# Patient Record
Sex: MALE | Race: WHITE | NOT HISPANIC OR LATINO | Employment: OTHER | URBAN - METROPOLITAN AREA
[De-identification: names, ages, dates, MRNs, and addresses within clinical notes are randomized per-mention and may not be internally consistent; named-entity substitution may affect disease eponyms.]

---

## 2017-10-11 ENCOUNTER — RECORDS - HEALTHEAST (OUTPATIENT)
Dept: LAB | Facility: CLINIC | Age: 62
End: 2017-10-11

## 2017-10-11 LAB
CHOLEST SERPL-MCNC: 238 MG/DL
FASTING STATUS PATIENT QL REPORTED: ABNORMAL
HDLC SERPL-MCNC: 47 MG/DL
LDLC SERPL CALC-MCNC: 151 MG/DL
TRIGL SERPL-MCNC: 201 MG/DL

## 2018-10-18 ENCOUNTER — RECORDS - HEALTHEAST (OUTPATIENT)
Dept: LAB | Facility: CLINIC | Age: 63
End: 2018-10-18

## 2018-10-18 LAB
ANION GAP SERPL CALCULATED.3IONS-SCNC: 11 MMOL/L (ref 5–18)
BUN SERPL-MCNC: 19 MG/DL (ref 8–22)
CALCIUM SERPL-MCNC: 9.5 MG/DL (ref 8.5–10.5)
CHLORIDE BLD-SCNC: 103 MMOL/L (ref 98–107)
CO2 SERPL-SCNC: 26 MMOL/L (ref 22–31)
CREAT SERPL-MCNC: 1.02 MG/DL (ref 0.7–1.3)
GFR SERPL CREATININE-BSD FRML MDRD: >60 ML/MIN/1.73M2
GLUCOSE BLD-MCNC: 118 MG/DL (ref 70–125)
POTASSIUM BLD-SCNC: 4.7 MMOL/L (ref 3.5–5)
SODIUM SERPL-SCNC: 140 MMOL/L (ref 136–145)

## 2019-12-06 ENCOUNTER — RECORDS - HEALTHEAST (OUTPATIENT)
Dept: LAB | Facility: CLINIC | Age: 64
End: 2019-12-06

## 2019-12-06 LAB
ANION GAP SERPL CALCULATED.3IONS-SCNC: 9 MMOL/L (ref 5–18)
BUN SERPL-MCNC: 16 MG/DL (ref 8–22)
CALCIUM SERPL-MCNC: 9 MG/DL (ref 8.5–10.5)
CHLORIDE BLD-SCNC: 104 MMOL/L (ref 98–107)
CHOLEST SERPL-MCNC: 247 MG/DL
CO2 SERPL-SCNC: 25 MMOL/L (ref 22–31)
CREAT SERPL-MCNC: 1.13 MG/DL (ref 0.7–1.3)
FASTING STATUS PATIENT QL REPORTED: ABNORMAL
GFR SERPL CREATININE-BSD FRML MDRD: >60 ML/MIN/1.73M2
GLUCOSE BLD-MCNC: 107 MG/DL (ref 70–125)
HDLC SERPL-MCNC: 47 MG/DL
LDLC SERPL CALC-MCNC: 161 MG/DL
POTASSIUM BLD-SCNC: 4.5 MMOL/L (ref 3.5–5)
PSA SERPL-MCNC: 2.4 NG/ML (ref 0–4.5)
SODIUM SERPL-SCNC: 138 MMOL/L (ref 136–145)
TRIGL SERPL-MCNC: 195 MG/DL

## 2019-12-08 LAB — HBA1C MFR BLD: 6.4 % (ref 4.2–6.1)

## 2020-05-21 ENCOUNTER — RECORDS - HEALTHEAST (OUTPATIENT)
Dept: LAB | Facility: CLINIC | Age: 65
End: 2020-05-21

## 2020-05-21 LAB
ANION GAP SERPL CALCULATED.3IONS-SCNC: 10 MMOL/L (ref 5–18)
BUN SERPL-MCNC: 21 MG/DL (ref 8–22)
CALCIUM SERPL-MCNC: 9.4 MG/DL (ref 8.5–10.5)
CHLORIDE BLD-SCNC: 101 MMOL/L (ref 98–107)
CHOLEST SERPL-MCNC: 222 MG/DL
CO2 SERPL-SCNC: 27 MMOL/L (ref 22–31)
CREAT SERPL-MCNC: 1.11 MG/DL (ref 0.7–1.3)
FASTING STATUS PATIENT QL REPORTED: ABNORMAL
GFR SERPL CREATININE-BSD FRML MDRD: >60 ML/MIN/1.73M2
GLUCOSE BLD-MCNC: 121 MG/DL (ref 70–125)
HDLC SERPL-MCNC: 61 MG/DL
LDLC SERPL CALC-MCNC: 141 MG/DL
POTASSIUM BLD-SCNC: 4.4 MMOL/L (ref 3.5–5)
SODIUM SERPL-SCNC: 138 MMOL/L (ref 136–145)
TRIGL SERPL-MCNC: 99 MG/DL

## 2020-05-26 ENCOUNTER — RECORDS - HEALTHEAST (OUTPATIENT)
Dept: LAB | Facility: CLINIC | Age: 65
End: 2020-05-26

## 2020-05-26 LAB — AST SERPL W P-5'-P-CCNC: 25 U/L (ref 0–40)

## 2020-11-04 ENCOUNTER — RECORDS - HEALTHEAST (OUTPATIENT)
Dept: LAB | Facility: CLINIC | Age: 65
End: 2020-11-04

## 2020-11-04 LAB
ALBUMIN SERPL-MCNC: 3.8 G/DL (ref 3.5–5)
ALP SERPL-CCNC: 64 U/L (ref 45–120)
ALT SERPL W P-5'-P-CCNC: 27 U/L (ref 0–45)
ANION GAP SERPL CALCULATED.3IONS-SCNC: 8 MMOL/L (ref 5–18)
AST SERPL W P-5'-P-CCNC: 20 U/L (ref 0–40)
BILIRUB SERPL-MCNC: 1.3 MG/DL (ref 0–1)
BUN SERPL-MCNC: 17 MG/DL (ref 8–22)
CALCIUM SERPL-MCNC: 8.8 MG/DL (ref 8.5–10.5)
CHLORIDE BLD-SCNC: 104 MMOL/L (ref 98–107)
CHOLEST SERPL-MCNC: 186 MG/DL
CO2 SERPL-SCNC: 26 MMOL/L (ref 22–31)
CREAT SERPL-MCNC: 0.99 MG/DL (ref 0.7–1.3)
FASTING STATUS PATIENT QL REPORTED: NORMAL
GFR SERPL CREATININE-BSD FRML MDRD: >60 ML/MIN/1.73M2
GLUCOSE BLD-MCNC: 117 MG/DL (ref 70–125)
HDLC SERPL-MCNC: 55 MG/DL
LDLC SERPL CALC-MCNC: 109 MG/DL
POTASSIUM BLD-SCNC: 4.6 MMOL/L (ref 3.5–5)
PROT SERPL-MCNC: 6.7 G/DL (ref 6–8)
SODIUM SERPL-SCNC: 138 MMOL/L (ref 136–145)
TRIGL SERPL-MCNC: 109 MG/DL

## 2020-11-05 LAB — B BURGDOR IGG+IGM SER QL: 0.02 INDEX VALUE

## 2021-04-13 ENCOUNTER — RECORDS - HEALTHEAST (OUTPATIENT)
Dept: LAB | Facility: CLINIC | Age: 66
End: 2021-04-13

## 2021-04-13 LAB
ANION GAP SERPL CALCULATED.3IONS-SCNC: 11 MMOL/L (ref 5–18)
BUN SERPL-MCNC: 16 MG/DL (ref 8–22)
CALCIUM SERPL-MCNC: 9 MG/DL (ref 8.5–10.5)
CHLORIDE BLD-SCNC: 105 MMOL/L (ref 98–107)
CHOLEST SERPL-MCNC: 170 MG/DL
CO2 SERPL-SCNC: 24 MMOL/L (ref 22–31)
CREAT SERPL-MCNC: 0.98 MG/DL (ref 0.7–1.3)
FASTING STATUS PATIENT QL REPORTED: NORMAL
GFR SERPL CREATININE-BSD FRML MDRD: >60 ML/MIN/1.73M2
GLUCOSE BLD-MCNC: 103 MG/DL (ref 70–125)
HDLC SERPL-MCNC: 51 MG/DL
LDLC SERPL CALC-MCNC: 103 MG/DL
POTASSIUM BLD-SCNC: 5.1 MMOL/L (ref 3.5–5)
SODIUM SERPL-SCNC: 140 MMOL/L (ref 136–145)
TRIGL SERPL-MCNC: 79 MG/DL

## 2021-11-02 ENCOUNTER — LAB REQUISITION (OUTPATIENT)
Dept: LAB | Facility: CLINIC | Age: 66
End: 2021-11-02

## 2021-11-02 DIAGNOSIS — I10 ESSENTIAL (PRIMARY) HYPERTENSION: ICD-10-CM

## 2021-11-02 LAB
ANION GAP SERPL CALCULATED.3IONS-SCNC: 11 MMOL/L (ref 5–18)
BUN SERPL-MCNC: 16 MG/DL (ref 8–22)
CALCIUM SERPL-MCNC: 9.2 MG/DL (ref 8.5–10.5)
CHLORIDE BLD-SCNC: 102 MMOL/L (ref 98–107)
CHOLEST SERPL-MCNC: 166 MG/DL
CO2 SERPL-SCNC: 26 MMOL/L (ref 22–31)
CREAT SERPL-MCNC: 1.03 MG/DL (ref 0.7–1.3)
GFR SERPL CREATININE-BSD FRML MDRD: 74 ML/MIN/1.73M2
GLUCOSE BLD-MCNC: 127 MG/DL (ref 70–125)
HDLC SERPL-MCNC: 53 MG/DL
LDLC SERPL CALC-MCNC: 89 MG/DL
POTASSIUM BLD-SCNC: 4.6 MMOL/L (ref 3.5–5)
SODIUM SERPL-SCNC: 139 MMOL/L (ref 136–145)
TRIGL SERPL-MCNC: 119 MG/DL

## 2021-11-02 PROCEDURE — 80061 LIPID PANEL: CPT | Performed by: FAMILY MEDICINE

## 2021-11-02 PROCEDURE — 80048 BASIC METABOLIC PNL TOTAL CA: CPT | Performed by: FAMILY MEDICINE

## 2022-06-08 ENCOUNTER — LAB REQUISITION (OUTPATIENT)
Dept: LAB | Facility: CLINIC | Age: 67
End: 2022-06-08

## 2022-06-08 DIAGNOSIS — E78.5 HYPERLIPIDEMIA, UNSPECIFIED: ICD-10-CM

## 2022-06-08 DIAGNOSIS — Z12.5 ENCOUNTER FOR SCREENING FOR MALIGNANT NEOPLASM OF PROSTATE: ICD-10-CM

## 2022-06-08 DIAGNOSIS — I10 ESSENTIAL (PRIMARY) HYPERTENSION: ICD-10-CM

## 2022-06-08 LAB
ANION GAP SERPL CALCULATED.3IONS-SCNC: 13 MMOL/L (ref 5–18)
BUN SERPL-MCNC: 17 MG/DL (ref 8–22)
CALCIUM SERPL-MCNC: 9.2 MG/DL (ref 8.5–10.5)
CHLORIDE BLD-SCNC: 104 MMOL/L (ref 98–107)
CHOLEST SERPL-MCNC: 179 MG/DL
CO2 SERPL-SCNC: 24 MMOL/L (ref 22–31)
CREAT SERPL-MCNC: 1 MG/DL (ref 0.7–1.3)
FASTING STATUS PATIENT QL REPORTED: NORMAL
GFR SERPL CREATININE-BSD FRML MDRD: 82 ML/MIN/1.73M2
GLUCOSE BLD-MCNC: 122 MG/DL (ref 70–125)
HDLC SERPL-MCNC: 65 MG/DL
LDLC SERPL CALC-MCNC: 96 MG/DL
POTASSIUM BLD-SCNC: 4.7 MMOL/L (ref 3.5–5)
PSA SERPL-MCNC: 4.42 UG/L (ref 0–4.5)
SODIUM SERPL-SCNC: 141 MMOL/L (ref 136–145)
TRIGL SERPL-MCNC: 91 MG/DL

## 2022-06-08 PROCEDURE — 83718 ASSAY OF LIPOPROTEIN: CPT | Performed by: FAMILY MEDICINE

## 2022-06-08 PROCEDURE — 80048 BASIC METABOLIC PNL TOTAL CA: CPT | Performed by: FAMILY MEDICINE

## 2022-06-08 PROCEDURE — G0103 PSA SCREENING: HCPCS | Performed by: FAMILY MEDICINE

## 2022-08-24 ENCOUNTER — LAB REQUISITION (OUTPATIENT)
Dept: LAB | Facility: CLINIC | Age: 67
End: 2022-08-24

## 2022-08-24 DIAGNOSIS — Z01.818 ENCOUNTER FOR OTHER PREPROCEDURAL EXAMINATION: ICD-10-CM

## 2022-08-24 LAB
ANION GAP SERPL CALCULATED.3IONS-SCNC: 10 MMOL/L (ref 7–15)
BUN SERPL-MCNC: 19.5 MG/DL (ref 8–23)
CALCIUM SERPL-MCNC: 9.3 MG/DL (ref 8.8–10.2)
CHLORIDE SERPL-SCNC: 103 MMOL/L (ref 98–107)
CREAT SERPL-MCNC: 1.15 MG/DL (ref 0.67–1.17)
DEPRECATED HCO3 PLAS-SCNC: 26 MMOL/L (ref 22–29)
GFR SERPL CREATININE-BSD FRML MDRD: 69 ML/MIN/1.73M2
GLUCOSE SERPL-MCNC: 125 MG/DL (ref 70–99)
POTASSIUM SERPL-SCNC: 5.2 MMOL/L (ref 3.4–5.3)
SODIUM SERPL-SCNC: 139 MMOL/L (ref 136–145)

## 2022-08-24 PROCEDURE — 80048 BASIC METABOLIC PNL TOTAL CA: CPT | Performed by: NURSE PRACTITIONER

## 2022-08-30 ENCOUNTER — LAB REQUISITION (OUTPATIENT)
Dept: LAB | Facility: CLINIC | Age: 67
End: 2022-08-30
Payer: COMMERCIAL

## 2022-08-30 DIAGNOSIS — Z03.818 ENCOUNTER FOR OBSERVATION FOR SUSPECTED EXPOSURE TO OTHER BIOLOGICAL AGENTS RULED OUT: ICD-10-CM

## 2022-08-30 LAB — SARS-COV-2 RNA RESP QL NAA+PROBE: NEGATIVE

## 2022-08-30 PROCEDURE — U0003 INFECTIOUS AGENT DETECTION BY NUCLEIC ACID (DNA OR RNA); SEVERE ACUTE RESPIRATORY SYNDROME CORONAVIRUS 2 (SARS-COV-2) (CORONAVIRUS DISEASE [COVID-19]), AMPLIFIED PROBE TECHNIQUE, MAKING USE OF HIGH THROUGHPUT TECHNOLOGIES AS DESCRIBED BY CMS-2020-01-R: HCPCS | Mod: ORL | Performed by: NURSE PRACTITIONER

## 2022-09-28 ENCOUNTER — LAB REQUISITION (OUTPATIENT)
Dept: LAB | Facility: CLINIC | Age: 67
End: 2022-09-28

## 2022-09-28 DIAGNOSIS — E78.5 HYPERLIPIDEMIA, UNSPECIFIED: ICD-10-CM

## 2022-09-28 LAB
AST SERPL W P-5'-P-CCNC: 27 U/L (ref 10–50)
CHOLEST SERPL-MCNC: 180 MG/DL
HDLC SERPL-MCNC: 66 MG/DL
LDLC SERPL CALC-MCNC: 100 MG/DL
NONHDLC SERPL-MCNC: 114 MG/DL
TRIGL SERPL-MCNC: 68 MG/DL

## 2022-09-28 PROCEDURE — 83718 ASSAY OF LIPOPROTEIN: CPT | Performed by: FAMILY MEDICINE

## 2022-09-28 PROCEDURE — 84450 TRANSFERASE (AST) (SGOT): CPT | Performed by: FAMILY MEDICINE

## 2022-12-05 ENCOUNTER — LAB REQUISITION (OUTPATIENT)
Dept: LAB | Facility: CLINIC | Age: 67
End: 2022-12-05

## 2022-12-05 DIAGNOSIS — E78.5 HYPERLIPIDEMIA, UNSPECIFIED: ICD-10-CM

## 2022-12-05 LAB
CHOLEST SERPL-MCNC: 118 MG/DL
HDLC SERPL-MCNC: 57 MG/DL
LDLC SERPL CALC-MCNC: 44 MG/DL
NONHDLC SERPL-MCNC: 61 MG/DL
TRIGL SERPL-MCNC: 87 MG/DL

## 2022-12-05 PROCEDURE — 80061 LIPID PANEL: CPT | Performed by: FAMILY MEDICINE

## 2023-05-31 ENCOUNTER — LAB REQUISITION (OUTPATIENT)
Dept: LAB | Facility: CLINIC | Age: 68
End: 2023-05-31

## 2023-05-31 DIAGNOSIS — I10 ESSENTIAL (PRIMARY) HYPERTENSION: ICD-10-CM

## 2023-05-31 DIAGNOSIS — E78.5 HYPERLIPIDEMIA, UNSPECIFIED: ICD-10-CM

## 2023-05-31 LAB
ANION GAP SERPL CALCULATED.3IONS-SCNC: 13 MMOL/L (ref 7–15)
BUN SERPL-MCNC: 17.5 MG/DL (ref 8–23)
CALCIUM SERPL-MCNC: 9.2 MG/DL (ref 8.8–10.2)
CHLORIDE SERPL-SCNC: 103 MMOL/L (ref 98–107)
CHOLEST SERPL-MCNC: 139 MG/DL
CREAT SERPL-MCNC: 1.04 MG/DL (ref 0.67–1.17)
DEPRECATED HCO3 PLAS-SCNC: 23 MMOL/L (ref 22–29)
GFR SERPL CREATININE-BSD FRML MDRD: 78 ML/MIN/1.73M2
GLUCOSE SERPL-MCNC: 97 MG/DL (ref 70–99)
HDLC SERPL-MCNC: 53 MG/DL
LDLC SERPL CALC-MCNC: 63 MG/DL
NONHDLC SERPL-MCNC: 86 MG/DL
POTASSIUM SERPL-SCNC: 4.2 MMOL/L (ref 3.4–5.3)
SODIUM SERPL-SCNC: 139 MMOL/L (ref 136–145)
TRIGL SERPL-MCNC: 113 MG/DL

## 2023-05-31 PROCEDURE — 80061 LIPID PANEL: CPT | Performed by: FAMILY MEDICINE

## 2023-05-31 PROCEDURE — 80048 BASIC METABOLIC PNL TOTAL CA: CPT | Performed by: FAMILY MEDICINE

## 2023-07-23 ENCOUNTER — LAB REQUISITION (OUTPATIENT)
Dept: LAB | Facility: CLINIC | Age: 68
End: 2023-07-23

## 2023-07-23 DIAGNOSIS — I49.9 CARDIAC ARRHYTHMIA, UNSPECIFIED: ICD-10-CM

## 2023-07-23 LAB
ANION GAP SERPL CALCULATED.3IONS-SCNC: 9 MMOL/L (ref 7–15)
BUN SERPL-MCNC: 18 MG/DL (ref 8–23)
CALCIUM SERPL-MCNC: 9 MG/DL (ref 8.8–10.2)
CHLORIDE SERPL-SCNC: 106 MMOL/L (ref 98–107)
CREAT SERPL-MCNC: 1.11 MG/DL (ref 0.67–1.17)
DEPRECATED HCO3 PLAS-SCNC: 27 MMOL/L (ref 22–29)
GFR SERPL CREATININE-BSD FRML MDRD: 72 ML/MIN/1.73M2
GLUCOSE SERPL-MCNC: 137 MG/DL (ref 70–99)
POTASSIUM SERPL-SCNC: 4.7 MMOL/L (ref 3.4–5.3)
SODIUM SERPL-SCNC: 142 MMOL/L (ref 136–145)

## 2023-07-23 PROCEDURE — 80048 BASIC METABOLIC PNL TOTAL CA: CPT | Performed by: PHYSICIAN ASSISTANT

## 2023-07-27 ENCOUNTER — HOSPITAL ENCOUNTER (OUTPATIENT)
Dept: CARDIOLOGY | Facility: HOSPITAL | Age: 68
Discharge: HOME | End: 2023-07-27
Attending: PHYSICIAN ASSISTANT | Admitting: PHYSICIAN ASSISTANT
Payer: COMMERCIAL

## 2023-07-27 DIAGNOSIS — I49.9 IRREGULAR HEART BEAT: ICD-10-CM

## 2023-07-27 PROCEDURE — 93270 REMOTE 30 DAY ECG REV/REPORT: CPT

## 2023-08-11 ENCOUNTER — TRANSFERRED RECORDS (OUTPATIENT)
Dept: HEALTH INFORMATION MANAGEMENT | Facility: CLINIC | Age: 68
End: 2023-08-11

## 2023-08-11 ENCOUNTER — LAB REQUISITION (OUTPATIENT)
Dept: LAB | Facility: CLINIC | Age: 68
End: 2023-08-11

## 2023-08-11 DIAGNOSIS — I48.91 UNSPECIFIED ATRIAL FIBRILLATION (H): ICD-10-CM

## 2023-08-11 LAB
ALBUMIN SERPL BCG-MCNC: 4.6 G/DL (ref 3.5–5.2)
ALP SERPL-CCNC: 74 U/L (ref 40–129)
ALT SERPL W P-5'-P-CCNC: 31 U/L (ref 0–70)
ANION GAP SERPL CALCULATED.3IONS-SCNC: 15 MMOL/L (ref 7–15)
AST SERPL W P-5'-P-CCNC: 26 U/L (ref 0–45)
BILIRUB SERPL-MCNC: 0.9 MG/DL
BUN SERPL-MCNC: 14.8 MG/DL (ref 8–23)
CALCIUM SERPL-MCNC: 9.3 MG/DL (ref 8.8–10.2)
CHLORIDE SERPL-SCNC: 102 MMOL/L (ref 98–107)
CREAT SERPL-MCNC: 1.05 MG/DL (ref 0.67–1.17)
DEPRECATED HCO3 PLAS-SCNC: 21 MMOL/L (ref 22–29)
GFR SERPL CREATININE-BSD FRML MDRD: 77 ML/MIN/1.73M2
GLUCOSE SERPL-MCNC: 123 MG/DL (ref 70–99)
POTASSIUM SERPL-SCNC: 4.7 MMOL/L (ref 3.4–5.3)
PROT SERPL-MCNC: 6.8 G/DL (ref 6.4–8.3)
SODIUM SERPL-SCNC: 138 MMOL/L (ref 136–145)
TSH SERPL DL<=0.005 MIU/L-ACNC: 2.18 UIU/ML (ref 0.3–4.2)

## 2023-08-11 PROCEDURE — 84443 ASSAY THYROID STIM HORMONE: CPT | Performed by: FAMILY MEDICINE

## 2023-08-11 PROCEDURE — 80053 COMPREHEN METABOLIC PANEL: CPT | Performed by: FAMILY MEDICINE

## 2023-08-11 PROCEDURE — 93272 ECG/REVIEW INTERPRET ONLY: CPT | Performed by: INTERNAL MEDICINE

## 2023-08-28 ENCOUNTER — TRANSFERRED RECORDS (OUTPATIENT)
Dept: HEALTH INFORMATION MANAGEMENT | Facility: CLINIC | Age: 68
End: 2023-08-28
Payer: COMMERCIAL

## 2023-08-28 LAB — EJECTION FRACTION: NORMAL %

## 2023-08-30 ENCOUNTER — TRANSFERRED RECORDS (OUTPATIENT)
Dept: HEALTH INFORMATION MANAGEMENT | Facility: CLINIC | Age: 68
End: 2023-08-30
Payer: COMMERCIAL

## 2023-08-30 ENCOUNTER — MEDICAL CORRESPONDENCE (OUTPATIENT)
Dept: HEALTH INFORMATION MANAGEMENT | Facility: CLINIC | Age: 68
End: 2023-08-30
Payer: COMMERCIAL

## 2023-08-30 ENCOUNTER — LAB REQUISITION (OUTPATIENT)
Dept: LAB | Facility: CLINIC | Age: 68
End: 2023-08-30

## 2023-08-30 DIAGNOSIS — R97.20 ELEVATED PROSTATE SPECIFIC ANTIGEN (PSA): ICD-10-CM

## 2023-08-30 LAB — PSA SERPL DL<=0.01 NG/ML-MCNC: 3.19 NG/ML (ref 0–4.5)

## 2023-08-30 PROCEDURE — 84153 ASSAY OF PSA TOTAL: CPT | Performed by: FAMILY MEDICINE

## 2023-09-14 ENCOUNTER — OFFICE VISIT (OUTPATIENT)
Dept: CARDIOLOGY | Facility: CLINIC | Age: 68
End: 2023-09-14
Payer: COMMERCIAL

## 2023-09-14 VITALS
SYSTOLIC BLOOD PRESSURE: 167 MMHG | HEIGHT: 70 IN | OXYGEN SATURATION: 98 % | BODY MASS INDEX: 34.8 KG/M2 | DIASTOLIC BLOOD PRESSURE: 80 MMHG | WEIGHT: 243.1 LBS | HEART RATE: 71 BPM

## 2023-09-14 DIAGNOSIS — I48.0 PAROXYSMAL ATRIAL FIBRILLATION (H): Primary | ICD-10-CM

## 2023-09-14 DIAGNOSIS — I10 BENIGN ESSENTIAL HYPERTENSION: ICD-10-CM

## 2023-09-14 PROCEDURE — 99204 OFFICE O/P NEW MOD 45 MIN: CPT | Performed by: INTERNAL MEDICINE

## 2023-09-14 RX ORDER — METOPROLOL SUCCINATE 25 MG/1
12.5 TABLET, EXTENDED RELEASE ORAL DAILY
Qty: 60 TABLET | Refills: 3 | Status: SHIPPED | OUTPATIENT
Start: 2023-09-14 | End: 2023-11-13

## 2023-09-14 RX ORDER — LISINOPRIL 40 MG/1
40 TABLET ORAL DAILY
COMMUNITY
Start: 2021-12-09

## 2023-09-14 RX ORDER — APIXABAN 5 MG/1
TABLET, FILM COATED ORAL
COMMUNITY
Start: 2023-08-11 | End: 2023-09-14

## 2023-09-14 RX ORDER — APIXABAN 5 MG/1
TABLET, FILM COATED ORAL
Qty: 180 TABLET | Refills: 3 | Status: SHIPPED | OUTPATIENT
Start: 2023-09-14 | End: 2024-02-14

## 2023-09-14 RX ORDER — ROSUVASTATIN CALCIUM 20 MG/1
20 TABLET, COATED ORAL DAILY
COMMUNITY

## 2023-09-14 RX ORDER — FLUTICASONE PROPIONATE 50 MCG
1 SPRAY, SUSPENSION (ML) NASAL DAILY PRN
COMMUNITY

## 2023-09-14 RX ORDER — LATANOPROST 50 UG/ML
1 SOLUTION/ DROPS OPHTHALMIC AT BEDTIME
COMMUNITY

## 2023-09-14 NOTE — LETTER
9/14/2023    Amado Baez MD  [address] [facility] [address]  [facility] [address]    RE: Kael Dubois       Dear Colleague,     I had the pleasure of seeing Kael Dubois in the [facility].      [facility]  [phone]          Assessment/Recommendations   Patient with known hypertension, possible sleep apnea with reduced O2 saturations on his Garmin device nocturnally.  He also has paroxysmal atrial fibrillation and he feels may be related to taking decongestants and black licorice.  Certainly this could be a stimulus but also I am concerned about sleep apnea as well and I suspect he will get recurrent episodes of atrial fibrillation.  His CHADS2 vascular score is 4 and should be on anticoagulants.  He would like to get off anticoagulants but I told him we would have to follow him for at least 2 or 3 more months to make sure he is not having any further episodes.  He does take check his heart rate and rhythm twice a day with his blood pressure monitor and his Esa watch which will be helpful in the future.    He does have 2-4 alcoholic beverages most days and he may be sensitive to alcohol as well.  He has known carotid artery disease and I think we need to exclude the possibility of significant epicardial coronary artery disease so we will set him up for stress nuclear study.  Left ventricular systolic function and thyroid studies are normal.    We did talk about potential triggers of alcohol and also talked briefly about the possibility of pulmonary vein isolation procedure if this becomes more problematic.    We will readd a tiny dose of beta-blocker and ask him to follow-up in our atrial fibrillation clinic in 2 to 3 months.  He will ask Dr. Baez about getting a home sleep study which I think is strongly advised.    Thank you for allowing us to participate in his care.       History of Present Illness/Subjective    Mr. Kael Dubois is a 67 year old male with known history  of hypertension and a history of carotid endarterectomy.  He also has hyperlipidemia and is on appropriate dose of statin with good control of his LDL cholesterol.  About 2 and half months ago he bought some black licorice and consumed it very quickly noticed that his blood pressure went up.  Shortly thereafter when he was checking his blood pressure his monitor said his heart rate was irregular.  He noticed no symptoms.  Specifically, no palpitations, shortness of breath, diaphoresis, or lightheadedness.  He continued to have episodes where his blood pressure was irregular and he went into see his doctor and by that time it did normalize.  He wore a monitor for 2 weeks and had no symptoms or irregular heartbeats.  The day after he took the monitor off he took a decongestant and had an irregular heartbeat and went in and had electrocardiogram which showed atrial fibrillation with a rapid ventricular spots and this was on .  I have reviewed the EKG and there are nonspecific ST-T wave changes.  He was placed on metoprolol 25 mg as well as Eliquis 5 mg twice daily.  He had an echocardiogram which showed normal left ventricular systolic function although was technically difficult.    The patient has not had any further episodes where his blood pressure cuff says his heart rate is irregular.  He does have an O2 sat monitor on his Esa device and he has saturations recorded at night down to 74%.  She he does snore he is not aware that he has sleep apnea.    He denies chest discomfort with physical activity denies dyspnea on exertion.  He has no orthopnea, paroxysmal nocturnal dyspnea and gets some mild peripheral edema.  No history rheumatic fever, heart murmur, cerebrovascular accident or TIA.    He does have hypertension and is diabetic.  Uncle  young of a myocardial infarction but no first-degree relatives.  Hyperlipidemia.  Treatment.  He quit smoking 35 years ago and has an occasional cigar.    [place]  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  He is  has 4 children.  1 daughter had heart surgery at age 12 and sounds like she had a patent ductus arteriosus corrected.        ECG: Personally reviewed.  See above       Physical Examination Review of Systems   BP (!) 167/80 (BP Location: Left arm, Patient Position: Left side, Cuff Size: Adult Large)   Pulse 71   Ht 1.778 m (5' 10\")   Wt 110.3 kg (243 lb 1.6 oz)   SpO2 98%   BMI 34.88 kg/m    Body mass index is 34.88 kg/m .  Wt Readings from Last 3 Encounters:   09/14/23 110.3 kg (243 lb 1.6 oz)     General Appearance:   Alert, cooperative and in no acute distress.   ENT/Mouth: Pink/moist oral mucosa   EYES:  no scleral icterus, normal conjunctivae   Neck: JVP. Normal. No Hepatojugular reflux. Thyroid not visualized.   Chest/Lungs:   Lungs are clear to auscultation, equal chest wall expansion.   Cardiovascular:   S1, S2 without murmur ,clicks or rubs. Brachial, radial and posterior tibial pulses are intact and symetric. No carotid bruits noted   Abdomen:  Nontender. BS+. No bruits.   Extremities: No cyanosis, clubbing and mild pretibial edema   Skin: no xanthelasma, warm.    Neurologic: normal arm movement bilateral, no tremors     Psychiatric: Appropriate affect.      Enc Vitals  BP: (!) 167/80  Pulse: 71  SpO2: 98 %  Weight: 110.3 kg (243 lb 1.6 oz)  Height: 177.8 cm (5' 10\")                                           Medical History  Surgical History Family History Social History   No past medical history on file. No past surgical history on file. No family history on file. Social History     Socioeconomic History    Marital status:      Spouse name: Not on file    Number of children: Not on file    Years of education: Not on file    Highest education level: Not on file   Occupational History    Not on file   Tobacco Use    Smoking status: Not on file    Smokeless " tobacco: Not on file   Substance and Sexual Activity    Alcohol use: Not on file    Drug use: Not on file    Sexual activity: Not on file   Other Topics Concern    Not on file   Social History Narrative    Not on file     Social Determinants of Health     Financial Resource Strain: Not on file   Food Insecurity: Not on file   Transportation Needs: Not on file   Physical Activity: Not on file   Stress: Not on file   Social Connections: Not on file   Intimate Partner Violence: Not on file   Housing Stability: Not on file          Medications  Allergies   Current Outpatient Medications   Medication Sig Dispense Refill    ELIQUIS ANTICOAGULANT 5 MG tablet 1 tab(s) orally twice a day 180 tablet 3    lisinopril (ZESTRIL) 10 MG tablet Take 2 tablets by mouth daily      metoprolol succinate ER (TOPROL XL) 25 MG 24 hr tablet Take 0.5 tablets (12.5 mg) by mouth daily 60 tablet 3    fluticasone (FLONASE) 50 MCG/ACT nasal spray Spray 1 spray in nostril      latanoprost (XALATAN) 0.005 % ophthalmic solution Place 1 drop into both eyes At Bedtime      rosuvastatin (CRESTOR) 20 MG tablet Take 20 mg by mouth daily      No Known Allergies      Lab Results    Chemistry/lipid CBC Cardiac Enzymes/BNP/TSH/INR   Lab Results   Component Value Date    CHOL 139 05/31/2023    HDL 53 05/31/2023    TRIG 113 05/31/2023    BUN 14.8 08/11/2023     08/11/2023    CO2 21 (L) 08/11/2023    No results found for: WBC, HGB, HCT, MCV, PLT Lab Results   Component Value Date    TSH 2.18 08/11/2023                                                Thank you for allowing me to participate in the care of your patient.      Sincerely,     Deo Zuniga MD     [facility]  cc:   No referring provider defined for this encounter.

## 2023-09-14 NOTE — PROGRESS NOTES
[facility]  [phone]          Assessment/Recommendations   Patient with known hypertension, possible sleep apnea with reduced O2 saturations on his Garmin device nocturnally.  He also has paroxysmal atrial fibrillation and he feels may be related to taking decongestants and black licorice.  Certainly this could be a stimulus but also I am concerned about sleep apnea as well and I suspect he will get recurrent episodes of atrial fibrillation.  His CHADS2 vascular score is 4 and should be on anticoagulants.  He would like to get off anticoagulants but I told him we would have to follow him for at least 2 or 3 more months to make sure he is not having any further episodes.  He does take check his heart rate and rhythm twice a day with his blood pressure monitor and his Esa watch which will be helpful in the future.    He does have 2-4 alcoholic beverages most days and he may be sensitive to alcohol as well.  He has known carotid artery disease and I think we need to exclude the possibility of significant epicardial coronary artery disease so we will set him up for stress nuclear study.  Left ventricular systolic function and thyroid studies are normal.    We did talk about potential triggers of alcohol and also talked briefly about the possibility of pulmonary vein isolation procedure if this becomes more problematic.    We will readd a tiny dose of beta-blocker and ask him to follow-up in our atrial fibrillation clinic in 2 to 3 months.  He will ask Dr. Baez about getting a home sleep study which I think is strongly advised.    Thank you for allowing us to participate in his care.       History of Present Illness/Subjective    Mr. Kael Dubois is a 67 year old male with known history of hypertension and a history of carotid endarterectomy.  He also has hyperlipidemia and is on appropriate dose of statin with good control of his LDL cholesterol.  About 2 and half months ago he bought  some black licorice and consumed it very quickly noticed that his blood pressure went up.  Shortly thereafter when he was checking his blood pressure his monitor said his heart rate was irregular.  He noticed no symptoms.  Specifically, no palpitations, shortness of breath, diaphoresis, or lightheadedness.  He continued to have episodes where his blood pressure was irregular and he went into see his doctor and by that time it did normalize.  He wore a monitor for 2 weeks and had no symptoms or irregular heartbeats.  The day after he took the monitor off he took a decongestant and had an irregular heartbeat and went in and had electrocardiogram which showed atrial fibrillation with a rapid ventricular spots and this was on .  I have reviewed the EKG and there are nonspecific ST-T wave changes.  He was placed on metoprolol 25 mg as well as Eliquis 5 mg twice daily.  He had an echocardiogram which showed normal left ventricular systolic function although was technically difficult.    The patient has not had any further episodes where his blood pressure cuff says his heart rate is irregular.  He does have an O2 sat monitor on his Esa device and he has saturations recorded at night down to 74%.  She he does snore he is not aware that he has sleep apnea.    He denies chest discomfort with physical activity denies dyspnea on exertion.  He has no orthopnea, paroxysmal nocturnal dyspnea and gets some mild peripheral edema.  No history rheumatic fever, heart murmur, cerebrovascular accident or TIA.    He does have hypertension and is diabetic.  Uncle  young of a myocardial infarction but no first-degree relatives.  Hyperlipidemia.  Treatment.  He quit smoking 35 years ago and has an occasional cigar.    [place]  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  He is  has 4 children.  1 daughter had heart surgery at age 12 and  "sounds like she had a patent ductus arteriosus corrected.        ECG: Personally reviewed.  See above       Physical Examination Review of Systems   BP (!) 167/80 (BP Location: Left arm, Patient Position: Left side, Cuff Size: Adult Large)   Pulse 71   Ht 1.778 m (5' 10\")   Wt 110.3 kg (243 lb 1.6 oz)   SpO2 98%   BMI 34.88 kg/m    Body mass index is 34.88 kg/m .  Wt Readings from Last 3 Encounters:   09/14/23 110.3 kg (243 lb 1.6 oz)     General Appearance:   Alert, cooperative and in no acute distress.   ENT/Mouth: Pink/moist oral mucosa   EYES:  no scleral icterus, normal conjunctivae   Neck: JVP. Normal. No Hepatojugular reflux. Thyroid not visualized.   Chest/Lungs:   Lungs are clear to auscultation, equal chest wall expansion.   Cardiovascular:   S1, S2 without murmur ,clicks or rubs. Brachial, radial and posterior tibial pulses are intact and symetric. No carotid bruits noted   Abdomen:  Nontender. BS+. No bruits.   Extremities: No cyanosis, clubbing and mild pretibial edema   Skin: no xanthelasma, warm.    Neurologic: normal arm movement bilateral, no tremors     Psychiatric: Appropriate affect.      Enc Vitals  BP: (!) 167/80  Pulse: 71  SpO2: 98 %  Weight: 110.3 kg (243 lb 1.6 oz)  Height: 177.8 cm (5' 10\")                                           Medical History  Surgical History Family History Social History   No past medical history on file. No past surgical history on file. No family history on file. Social History     Socioeconomic History    Marital status:      Spouse name: Not on file    Number of children: Not on file    Years of education: Not on file    Highest education level: Not on file   Occupational History    Not on file   Tobacco Use    Smoking status: Not on file    Smokeless tobacco: Not on file   Substance and Sexual Activity    Alcohol use: Not on file    Drug use: Not on file    Sexual activity: Not on file   Other Topics Concern    Not on file   Social History Narrative "    Not on file     Social Determinants of Health     Financial Resource Strain: Not on file   Food Insecurity: Not on file   Transportation Needs: Not on file   Physical Activity: Not on file   Stress: Not on file   Social Connections: Not on file   Intimate Partner Violence: Not on file   Housing Stability: Not on file          Medications  Allergies   Current Outpatient Medications   Medication Sig Dispense Refill    ELIQUIS ANTICOAGULANT 5 MG tablet 1 tab(s) orally twice a day 180 tablet 3    lisinopril (ZESTRIL) 10 MG tablet Take 2 tablets by mouth daily      metoprolol succinate ER (TOPROL XL) 25 MG 24 hr tablet Take 0.5 tablets (12.5 mg) by mouth daily 60 tablet 3    fluticasone (FLONASE) 50 MCG/ACT nasal spray Spray 1 spray in nostril      latanoprost (XALATAN) 0.005 % ophthalmic solution Place 1 drop into both eyes At Bedtime      rosuvastatin (CRESTOR) 20 MG tablet Take 20 mg by mouth daily      No Known Allergies      Lab Results    Chemistry/lipid CBC Cardiac Enzymes/BNP/TSH/INR   Lab Results   Component Value Date    CHOL 139 05/31/2023    HDL 53 05/31/2023    TRIG 113 05/31/2023    BUN 14.8 08/11/2023     08/11/2023    CO2 21 (L) 08/11/2023    No results found for: WBC, HGB, HCT, MCV, PLT Lab Results   Component Value Date    TSH 2.18 08/11/2023

## 2023-09-18 ENCOUNTER — HOSPITAL ENCOUNTER (OUTPATIENT)
Dept: NUCLEAR MEDICINE | Facility: HOSPITAL | Age: 68
Discharge: HOME | End: 2023-09-18
Attending: INTERNAL MEDICINE
Payer: COMMERCIAL

## 2023-09-18 ENCOUNTER — HOSPITAL ENCOUNTER (OUTPATIENT)
Dept: CARDIOLOGY | Facility: HOSPITAL | Age: 68
Discharge: HOME | End: 2023-09-18
Attending: INTERNAL MEDICINE
Payer: COMMERCIAL

## 2023-09-18 DIAGNOSIS — I10 BENIGN ESSENTIAL HYPERTENSION: ICD-10-CM

## 2023-09-18 DIAGNOSIS — I48.0 PAROXYSMAL ATRIAL FIBRILLATION (H): ICD-10-CM

## 2023-09-18 LAB
CV STRESS CURRENT BP HE: NORMAL
CV STRESS CURRENT HR HE: 106
CV STRESS CURRENT HR HE: 107
CV STRESS CURRENT HR HE: 108
CV STRESS CURRENT HR HE: 110
CV STRESS CURRENT HR HE: 110
CV STRESS CURRENT HR HE: 118
CV STRESS CURRENT HR HE: 118
CV STRESS CURRENT HR HE: 122
CV STRESS CURRENT HR HE: 124
CV STRESS CURRENT HR HE: 128
CV STRESS CURRENT HR HE: 131
CV STRESS CURRENT HR HE: 132
CV STRESS CURRENT HR HE: 134
CV STRESS CURRENT HR HE: 134
CV STRESS CURRENT HR HE: 137
CV STRESS CURRENT HR HE: 137
CV STRESS CURRENT HR HE: 68
CV STRESS CURRENT HR HE: 73
CV STRESS CURRENT HR HE: 92
CV STRESS CURRENT HR HE: 93
CV STRESS CURRENT HR HE: 93
CV STRESS CURRENT HR HE: 94
CV STRESS CURRENT HR HE: 96
CV STRESS CURRENT HR HE: 97
CV STRESS DEVIATION TIME HE: NORMAL
CV STRESS ECHO PERCENT HR HE: NORMAL
CV STRESS EXERCISE STAGE HE: NORMAL
CV STRESS EXERCISE STAGE REACHED HE: NORMAL
CV STRESS FINAL RESTING BP HE: NORMAL
CV STRESS FINAL RESTING HR HE: 93
CV STRESS MAX HR HE: 137
CV STRESS MAX TREADMILL GRADE HE: 14
CV STRESS MAX TREADMILL SPEED HE: 3.4
CV STRESS PEAK DIA BP HE: NORMAL
CV STRESS PEAK SYS BP HE: NORMAL
CV STRESS PHASE HE: NORMAL
CV STRESS PROTOCOL HE: NORMAL
CV STRESS RESTING PT POSITION HE: NORMAL
CV STRESS RESTING PT POSITION HE: NORMAL
CV STRESS ST DEVIATION AMOUNT HE: NORMAL
CV STRESS ST DEVIATION ELEVATION HE: NORMAL
CV STRESS ST EVELATION AMOUNT HE: NORMAL
CV STRESS TEST TYPE HE: NORMAL
CV STRESS TOTAL STAGE TIME MIN 1 HE: NORMAL
RATE PRESSURE PRODUCT: NORMAL
STRESS ANGINA INDEX: 0
STRESS ECHO BASELINE DIASTOLIC HE: 83
STRESS ECHO BASELINE HR: 68
STRESS ECHO BASELINE SYSTOLIC BP: 170
STRESS ECHO CALCULATED PERCENT HR: 91 %
STRESS ECHO LAST STRESS DIASTOLIC BP: 74
STRESS ECHO LAST STRESS HR: 137
STRESS ECHO LAST STRESS SYSTOLIC BP: 202
STRESS ECHO POST ESTIMATED WORKLOAD: 10.3
STRESS ECHO POST EXERCISE DUR MIN: 8
STRESS ECHO POST EXERCISE DUR SEC: 59
STRESS ECHO TARGET HR: 151

## 2023-09-18 PROCEDURE — 78452 HT MUSCLE IMAGE SPECT MULT: CPT

## 2023-09-18 PROCEDURE — 93018 CV STRESS TEST I&R ONLY: CPT | Performed by: GENERAL ACUTE CARE HOSPITAL

## 2023-09-18 PROCEDURE — 78452 HT MUSCLE IMAGE SPECT MULT: CPT | Mod: 26 | Performed by: GENERAL ACUTE CARE HOSPITAL

## 2023-09-18 PROCEDURE — 93016 CV STRESS TEST SUPVJ ONLY: CPT | Performed by: INTERNAL MEDICINE

## 2023-09-18 PROCEDURE — 343N000001 HC RX 343: Performed by: INTERNAL MEDICINE

## 2023-09-18 PROCEDURE — A9500 TC99M SESTAMIBI: HCPCS | Performed by: INTERNAL MEDICINE

## 2023-09-18 PROCEDURE — 93017 CV STRESS TEST TRACING ONLY: CPT

## 2023-09-18 RX ADMIN — Medication 8.4 MILLICURIE: at 07:41

## 2023-09-18 RX ADMIN — Medication 36.1 MILLICURIE: at 08:45

## 2023-10-15 ENCOUNTER — HEALTH MAINTENANCE LETTER (OUTPATIENT)
Age: 68
End: 2023-10-15

## 2023-11-13 ENCOUNTER — OFFICE VISIT (OUTPATIENT)
Dept: CARDIOLOGY | Facility: CLINIC | Age: 68
End: 2023-11-13
Attending: INTERNAL MEDICINE
Payer: COMMERCIAL

## 2023-11-13 VITALS
RESPIRATION RATE: 16 BRPM | HEIGHT: 70 IN | HEART RATE: 78 BPM | SYSTOLIC BLOOD PRESSURE: 162 MMHG | WEIGHT: 237 LBS | DIASTOLIC BLOOD PRESSURE: 70 MMHG | BODY MASS INDEX: 33.93 KG/M2

## 2023-11-13 DIAGNOSIS — I48.0 PAROXYSMAL ATRIAL FIBRILLATION (H): ICD-10-CM

## 2023-11-13 DIAGNOSIS — I10 BENIGN ESSENTIAL HYPERTENSION: ICD-10-CM

## 2023-11-13 PROBLEM — I65.23 BILATERAL CAROTID ARTERY STENOSIS: Status: ACTIVE | Noted: 2023-11-13

## 2023-11-13 PROBLEM — R93.1 ELEVATED CORONARY ARTERY CALCIUM SCORE: Status: ACTIVE | Noted: 2023-11-13

## 2023-11-13 PROBLEM — E78.5 HYPERLIPIDEMIA, UNSPECIFIED HYPERLIPIDEMIA TYPE: Status: ACTIVE | Noted: 2023-11-13

## 2023-11-13 PROBLEM — K57.90 DIVERTICULOSIS: Status: ACTIVE | Noted: 2023-11-13

## 2023-11-13 PROBLEM — E11.9 TYPE 2 DIABETES MELLITUS WITHOUT COMPLICATION, WITHOUT LONG-TERM CURRENT USE OF INSULIN (H): Status: ACTIVE | Noted: 2023-11-13

## 2023-11-13 PROBLEM — R97.20 ELEVATED PSA: Status: ACTIVE | Noted: 2023-11-13

## 2023-11-13 PROCEDURE — 99215 OFFICE O/P EST HI 40 MIN: CPT | Performed by: NURSE PRACTITIONER

## 2023-11-13 RX ORDER — METOPROLOL SUCCINATE 25 MG/1
25 TABLET, EXTENDED RELEASE ORAL DAILY
Qty: 90 TABLET | Refills: 3 | Status: SHIPPED | OUTPATIENT
Start: 2023-11-13

## 2023-11-13 NOTE — LETTER
11/13/2023    Amado Baez MD  [address]  [facility] [address]    RE: Kael Dubois       Dear Colleague,     I had the pleasure of seeing Kael Dub[facility] in the [facility].    HEART CARE ELECTROPHYSIOLOGY CONSULTATON NOTE      [facility]  [phone]    Thank you, Dr. Zuniga, for asking the [facility] Electrophysiology team to see . Kael Dubois to evaluate atrial fibrillation.    Assessment/Recommendations   Assessment/Plan:  1.  Paroxysmal Atrial Fibrillation: Recently documented PAF.  Status is unclear, may be minimally symptomatic or asymptomatic.  He was in A-fib briefly during his exam today and asymptomatic.  We had a lengthy discussion of the physiology and natural progression of atrial fibrillation and treatment options including rate control versus rhythm control depending upon the presence of symptoms.  We further discussed rhythm control with medications or pulmonary vein isolation ablation.  Discussed avoidance of possible triggers.  He was given information to review at home.  -- Increase metoprolol succinate to 25 mg daily  -- In 1 month, wear MCOT x2 weeks    He was reassured that atrial fibrillation is not life-threatening, but carries an increased risk for stroke.  He has a [id number] score of 4 for age 65-74, HTN, vascular disease, DM .  Continue Eliquis 5 mg twice daily for stroke prophylaxis.    2.  Hypertension: Blood pressures have been elevated for the last couple of weeks, but have started to decrease.  Dr. Baez recently increased his lisinopril.  Continue on current medications and follow-up with Dr. Baez for further management.    3.  Possible sleep apnea: We discussed the correlation between untreated sleep apnea and atrial fibrillation.  He is scheduled for sleep study 12/5/2023.  Recommend treatment if indicated.    Follow up in 3 months     History of Present Illness/Subjective    HPI: Kael SIMONS Silvino is a  "68 year old male who comes in today for EP consultation of atrial fibrillation.  Is a history of paroxysmal atrial fibrillation, hypertension, hyperlipidemia, carotid stenosis s/p left CEA 9/1/2022, type 2 diabetes, possible sleep apnea.    Arrhythmia history  Dx/date: Paroxysmal atrial fibrillation diagnosed 8/11/2023, though episodes of irregular heart rate on his blood pressure monitor for about 2 months prior.  He believes episodes were triggered by consuming a large quantity of black licorice and taking pseudoephedrine.  Sx: Symptoms status unclear, mild at best  [id number] score: 4 for age 65-74, HTN, vascular disease, DM  Oral anticoagulation: Eliquis 5 mg twice daily  Antiarrhythmic medications, AV danielle blocking agents: Metoprolol succinate 12.5 mg daily  Procedures  DCCV: N/A  Ablation: N/A    He states that generally he feels well.  He walks 3 to 4 miles a day and feels good with activity.  He has felt a bit \"off\" at rest.  He also notes his blood pressure has been elevated for the last couple of weeks; his lisinopril was increased few days ago and blood pressure has been better, 120s/70s this morning.  He has been checking his rhythm using his smart watch which primarily read sinus rhythm, occasionally A-fib in the 80s.  He states he is not specifically aware of arrhythmia.  He denies chest discomfort, palpitations, abdominal fullness/bloating or peripheral edema, shortness of breath, paroxysmal nocturnal dyspnea, orthopnea, lightheadedness, dizziness, pre-syncope, or syncope.    Cardiographics (EKG personally reviewed):  EKG done 8/31/2023 shows atrial fibrillation with rapid ventricular response at 131 bpm    Watch ECG this morning shows A-fib in the 80s, appears to convert to sinus rhythm during the recording.    MARIELA:  Cardiac event monitoring from 7/27/2023 to 8/9/2023 (monitored duration 12d 17h 14m).  Baseline rhythm was sinus rhythm 86bpm.    Reported heart rate range 50 to 120bpm, average " "76bpm.  1 symptom triggers correlated to sinus rhythm 84bpm.  No tachyarrhythmias.  No atrial fibrillation.  Intermittent first degree AV block.  There were no pauses noted.  Supraventricular and ventricular ectopic beat frequency are not reported on this monitoring modality.      ECHO done 8/28/2023 ([facility]):    NM treadmill stress test done 9/18/2023:    The exercise nuclear stress test is negative for inducible myocardial ischemia or infarction.    The exercise stress electrocardiogram is negative for inducible ischemic EKG changes.    The patient's exercise capacity is average for age and gender. The patient exercised for 8:59 minutes on the Amado protocol, achieving 10.3 METs and 91% the age-predicted maximum heart rate. The patient had no symptoms.    The left ventricular ejection fraction at stress is greater than 70%.    The patient is at a low risk of future cardiac ischemic events.    There is no prior study for comparison.    I have reviewed and updated the patient's Past Medical History, Social History, Family History and Medication List.  Outside records reviewed.     Physical Examination  Review of Systems   Vitals: BP (!) 162/70 (BP Location: Left arm, Patient Position: Sitting, Cuff Size: Adult Large)   Pulse 78   Resp 16   Ht 1.778 m (5' 10\")   Wt 107.5 kg (237 lb)   BMI 34.01 kg/m    BMI= Body mass index is 34.01 kg/m .  Wt Readings from Last 3 Encounters:   11/13/23 107.5 kg (237 lb)   09/14/23 110.3 kg (243 lb 1.6 oz)       General Appearance:   Alert, well-appearing and in no acute distress.   HEENT: Atraumatic, normocephalic.  No scleral icterus, normal conjunctivae, EOMs intact, PERRL.  Mucous membranes pink and moist.     Chest/Lungs:   Chest symmetric, spine straight.  Respirations unlabored.  Lungs are clear to auscultation.   Cardiovascular:   Regular rate and rhythm.  Normal first and second heart sounds with no murmurs, rubs, or gallops; radial and posterior tibial pulses are " intact, no edema.   Abdomen:  Soft, nondistended, bowel sounds present.   Extremities: No cyanosis or clubbing.   Musculoskeletal: Moves all extremities.  Gait steady.   Skin: Warm, dry, intact.    Neurologic: Mood and affect are appropriate.  Alert and oriented to person, place, time, and situation.     ROS: 10 point ROS neg other than the symptoms noted above in the HPI.        Medical History  Surgical History Family History Social History   Past Medical History:   Diagnosis Date    Benign essential hypertension 2023    Bilateral carotid artery stenosis 2023    Diverticulosis 2023    Elevated coronary artery calcium score 2023    Elevated PSA 2023    Hyperlipidemia, unspecified hyperlipidemia type 2023    Paroxysmal atrial fibrillation (H) 2023    Type 2 diabetes mellitus without complication, without long-term current use of insulin (H) 2023     Past Surgical History:   Procedure Laterality Date    CAROTID ENDARTERECTOMY Left 2022    INGUINAL HERNIA REPAIR      UMBILICAL HERNIA REPAIR       History reviewed. No pertinent family history.     Social History     Socioeconomic History    Marital status:      Spouse name: Not on file    Number of children: Not on file    Years of education: Not on file    Highest education level: Not on file   Occupational History    Not on file   Tobacco Use    Smoking status: Former     Types: Cigarettes     Quit date:      Years since quittin.8    Smokeless tobacco: Never   Substance and Sexual Activity    Alcohol use: Not on file    Drug use: Never    Sexual activity: Not on file   Other Topics Concern    Not on file   Social History Narrative    Not on file     Social Determinants of Health     Financial Resource Strain: Not on file   Food Insecurity: Not on file   Transportation Needs: Not on file   Physical Activity: Not on file   Stress: Not on file   Social Connections: Not on file   Interpersonal Safety:  "Not on file   Housing Stability: Not on file           Medications  Allergies   Current Outpatient Medications   Medication Sig Dispense Refill    ELIQUIS ANTICOAGULANT 5 MG tablet 1 tab(s) orally twice a day 180 tablet 3    fluticasone (FLONASE) 50 MCG/ACT nasal spray Spray 1 spray into both nostrils daily      latanoprost (XALATAN) 0.005 % ophthalmic solution Place 1 drop into both eyes At Bedtime      lisinopril (ZESTRIL) 40 MG tablet Take 40 mg by mouth daily      metoprolol succinate ER (TOPROL XL) 25 MG 24 hr tablet Take 1 tablet (25 mg) by mouth daily 90 tablet 3    rosuvastatin (CRESTOR) 20 MG tablet Take 20 mg by mouth daily       No Known Allergies       Lab Results    Chemistry/lipid CBC Cardiac Enzymes/BNP/TSH/INR   Recent Labs   Lab Test 05/31/23  0933   CHOL 139   HDL 53   LDL 63   TRIG 113     Recent Labs   Lab Test 05/31/23  0933 12/05/22  0910 09/28/22  1407   LDL 63 44 100     Recent Labs   Lab Test 08/11/23  1314      POTASSIUM 4.7   CHLORIDE 102   CO2 21*   *   BUN 14.8   CR 1.05   GFRESTIMATED 77   MEREDITH 9.3     Recent Labs   Lab Test 08/11/23  1314 07/23/23  1153 05/31/23  0933   CR 1.05 1.11 1.04     Recent Labs   Lab Test 12/06/19  1120   A1C 6.4*    No results for input(s): \"WBC\", \"HGB\", \"HCT\", \"MCV\", \"PLT\" in the last 45621 hours.  No results for input(s): \"HGB\" in the last 17644 hours. No results for input(s): \"TROPONINI\" in the last 76885 hours.  No results for input(s): \"BNP\", \"NTBNPI\", \"NTBNP\" in the last 59046 hours.  Recent Labs   Lab Test 08/11/23  1314   TSH 2.18     No results for input(s): \"INR\" in the last 24366 hours.   52 minutes were spent on the date of encounter performing chart review, history and exam, documentation, and further activities as noted above.            Thank you for allowing me to participate in the care of your patient.      Sincerely,     AARON Ceballos [facility]  cc:   Deo " JULIA Zuniga MD  [address]  [place] [address]

## 2023-11-13 NOTE — PATIENT INSTRUCTIONS
[facility]  Cardiac Electrophysiology  [address]  [place] [address]   Office: [phone]  Fax: [phone]       Kael Dubois,    It was a pleasure to see you today at the [facility].     My recommendations after this visit include:    Increase metoprolol to 25 mg once daily, take at night  Continue Eliquis 5 mg every 12 hours to decrease stroke risk    Keep a log of A fib/symptoms.  Call if you have frequent or prolonged A fib.    In 1 month, wear heart monitor for 2 weeks    Follow up in 3 months, or sooner if needed    Lashell Anthony, CNP  [facility], Electrophysiology  [phone]  EP nurses [phone]             ATRIAL FIBRILLATION: Patient Information     What is atrial fibrillation?  Atrial fibrillation (AF, A-fib) is a common heart rhythm problem (arrhythmia) occurring within the upper chambers of the heart (the atria).  In normal rhythm, the upper and lower chambers of the heart are electrically driven to contract in a coordinated sequence.  In atrial fibrillation, the atria lose their ability to contract because of rapid and chaotic electrical activity.  The lower chambers of the heart (the ventricles) continue to pump blood throughout the body, though with irregular and often faster rate due to the chaotic activity within the atria.          How do I know if I have atrial fibrillation?   Some people may feel their heart beating faster, harder, or irregularly while in atrial fibrillation.  Others may be lightheaded, fatigued, feel weak or tired or become more short of breath especially with activities.  Some patients have no symptoms at all.  Atrial fibrillation may be found due to an irregular pulse or on an electrocardiogram (ECG). Atrial fibrillation can start and stop on its own, and episodes can last from seconds to several months.       How common is atrial fibrillation?   An estimated 3-6 million people in  the [place] have atrial fibrillation.  Atrial fibrillation is a common heart rhythm problem for older persons, affecting as estimated 12-15% of people over the age of 65 years of age.     What causes atrial fibrillation?   Age is the most important risk factor for atrial fibrillation.  Atrial fibrillation is more common in people with other heart disease, high blood pressure, diabetes, obesity, sleep apnea and in people who regularly consume alcohol.  Surgery, lung disease, or thyroid problems can lead to atrial fibrillation.  Atrial fibrillation has multiple possible causes, and in most cases a single cause cannot be found.  Atrial fibrillation is a progressive condition, usually starting with at an early stage with short and infrequent episodes.  In later stages of disease, more frequent and longer lasting episodes of atrial fibrillation occur, ultimately culminating in episodes which do not spontaneously terminate.  Generally, more enlargement and scarring within the upper chambers of the heart is observed as atrial fibrillation progresses from early to late-stage disease.     How is atrial fibrillation diagnosed and evaluated?    Because of its start-stop nature, atrial fibrillation can be challenging to diagnose.  Atrial fibrillation is most commonly diagnosed via cardiac rhythm recordings - either an ECG or wearable cardiac rhythm monitor.  For patients with pacemakers, defibrillators or implantable loop recorders, atrial fibrillation may be recorded via these devices.  Recently, commercially available devices (eg. Apple Watch, Efficas device, certain FitBit devices, others) can allow patients to take 30 second cardiac rhythm recordings which may document atrial fibrillation.  Once atrial fibrillation is diagnosed, additional tests include blood tests and an echocardiogram.  The echocardiogram uses ultrasound to look at your heart to assess your cardiac function and evaluate for other heart disease.   Additional evaluation may include CT or MRI studies.     Is atrial fibrillation dangerous?   Atrial fibrillation is not usually a life-threatening arrhythmia.  The most serious consequences of atrial fibrillation including stroke and worsening of overall cardiac function.  While in atrial fibrillation, the upper cardiac chambers do not contract normally, resulting in slower blood flow and increased risk of clot formation.  If this blood clot becomes detached from the heart a stroke can occur.  Unfortunately, stroke can be the first sign of atrial fibrillation for some people.  With a stroke, you may notice abnormal sensation, weakness on one side of the body or face, changes in your vision or speech.  If you have any of these signs, you should contact EMS and be evaluated in an emergency room as soon as possible.       How is atrial fibrillation treated?     Several treatment options exist for suppressing atrial fibrillation - however, it is not an easily curable arrhythmia.  The first goal in managing atrial fibrillation is to minimize stroke risk.  The second goal is to improve symptoms associated with atrial fibrillation.  Finally, in patients with reduced cardiac function, maintaining normal rhythm can help improve cardiac function.       Blood thinners are used to reduce the risk of stroke in patients with high estimated stroke risk related to atrial fibrillation.  For patients at higher risk of bleeding related to blood thinner use, implantable devices may be an option to reduce stroke risk without the need for long term blood thinner use.       Atrial fibrillation can be managed via two strategies: rate control and rhythm control.  In a rate control strategy, continued atrial fibrillation is accepted and medications (eg. beta-blockers or calcium channel blockers) are used to control the lower chamber rate.  In a rhythm control strategy, anti-arrhythmic medications or catheter ablation are used to maintain  normal cardiac rhythm and slow disease progression by suppressing atrial fibrillation.  A procedure called a cardioversion, in which an electric shock is delivered through patches placed on the chest wall while under deep sedation, can be performed to temporarily restore normal cardiac rhythm, though does not address the chance of atrial fibrillation recurrence.  Treatments are more effective for earlier rather than later stage atrial fibrillation.  Lifestyle modifications (maintaining a healthy weight, aerobic exercise, diagnosing and treating sleep apnea, and minimizing alcohol intake) are important elements of atrial fibrillation rhythm control.      What is catheter ablation for atrial fibrillation?  Cardiac catheter ablation is a commonly performed, minimally invasive procedure performed by a cardiac electrophysiologist to treat many different cardiac rhythm abnormalities.  During catheter ablation, long, thin, flexible tubes are advanced into the heart via small sheaths inserted into the femoral veins and thermal energy (either heating or cooling) is applied within the heart to disrupt abnormal electrical activity.  Atrial fibrillation ablation is performed under general anesthesia, with procedures generally taking approximately 2-3 hours.  Patients are typically observed for 3-5 hours after the ablation, and in most cases can be discharged home the same day.  Atrial fibrillation ablation is associated with better outcomes (mortality, cardiovascular hospitalizations, atrial arrhythmia recurrences) compared to antiarrhythmic drug therapy.  However, atrial fibrillation recurrences are not uncommon, and repeat catheter ablation may be offered.  Your electrophysiology team can review atrial fibrillation ablation, anticipated success rates, risks, and recovery expectations with you.     What are anti-arrhythmic medications?  Anti-arrhythmic medications are specialized drugs which alter cardiac electrical  functioning to suppress arrhythmias.  There are several anti-arrhythmic medications available, each with its own success rate and side effects.  Some anti-arrhythmic medications are less effective though safer to use, others are more effective though have serious potential toxicities.  Atrial fibrillation recurrences are common and may require dose adjustment or change in antiarrhythmic therapy.  Your electrophysiology team will carefully consider which medication would be the best and safest for your particular case.       Can I live a normal life?    The goal of atrial fibrillation management is for patients to live normal lives without being limited by symptoms related to atrial fibrillation.     Are any additional educational resources available?  There are a number of excellent atrial fibrillation education resources available to you online.  A few options you may wish to review include:  hrsonline.org/guide-atrial-fibrillation  afibmatters.org  getsmartaboutafib.com  stopaf.com     What comes next?    Consider your management options and let us know how we can help in your decision process.  Please take medications as they have been prescribed.  You should also get any tests that may have been ordered for you.       When to Call Your Doctor or seek emergency care:  Call your doctor or seek emergency care if you have any significant changes with the following:  Weakness  Dizziness  Fainting  Fatigue  Shortness of breath  Chest pain with increased activity  If you are concerned that your heart rate is too fast or too slow  Bleeding that does not stop in 10 minutes  Coughing or throwing up blood  Bloody diarrhea or bleeding hemorrhoids  Dark-colored urine or black stool  Allergic reactions:  Rash  Itching  Swelling  Trouble breathing or swallowing        Please call the [facility] at [phone] if you have concerns about your symptoms, your medicines, or your follow-up appointments.

## 2023-11-13 NOTE — PROGRESS NOTES
HEART CARE ELECTROPHYSIOLOGY CONSULTATON NOTE      [facility]  [phone]    Thank you, Dr. Zuniga, for asking the [facility] Electrophysiology team to see . Kael Dubois to evaluate atrial fibrillation.    Assessment/Recommendations   Assessment/Plan:  1.  Paroxysmal Atrial Fibrillation: Recently documented PAF.  Status is unclear, may be minimally symptomatic or asymptomatic.  He was in A-fib briefly during his exam today and asymptomatic.  We had a lengthy discussion of the physiology and natural progression of atrial fibrillation and treatment options including rate control versus rhythm control depending upon the presence of symptoms.  We further discussed rhythm control with medications or pulmonary vein isolation ablation.  Discussed avoidance of possible triggers.  He was given information to review at home.  -- Increase metoprolol succinate to 25 mg daily  -- In 1 month, wear MCOT x2 weeks    He was reassured that atrial fibrillation is not life-threatening, but carries an increased risk for stroke.  He has a [id number] score of 4 for age 65-74, HTN, vascular disease, DM .  Continue Eliquis 5 mg twice daily for stroke prophylaxis.    2.  Hypertension: Blood pressures have been elevated for the last couple of weeks, but have started to decrease.  Dr. Baez recently increased his lisinopril.  Continue on current medications and follow-up with Dr. Baez for further management.    3.  Possible sleep apnea: We discussed the correlation between untreated sleep apnea and atrial fibrillation.  He is scheduled for sleep study 12/5/2023.  Recommend treatment if indicated.    Follow up in 3 months     History of Present Illness/Subjective    HPI: Kael Dubois is a 68 year old male who comes in today for EP consultation of atrial fibrillation.  Is a history of paroxysmal atrial fibrillation, hypertension, hyperlipidemia, carotid stenosis s/p left CEA 9/1/2022, type  "2 diabetes, possible sleep apnea.    Arrhythmia history  Dx/date: Paroxysmal atrial fibrillation diagnosed 8/11/2023, though episodes of irregular heart rate on his blood pressure monitor for about 2 months prior.  He believes episodes were triggered by consuming a large quantity of black licorice and taking pseudoephedrine.  Sx: Symptoms status unclear, mild at best  [id number] score: 4 for age 65-74, HTN, vascular disease, DM  Oral anticoagulation: Eliquis 5 mg twice daily  Antiarrhythmic medications, AV danielle blocking agents: Metoprolol succinate 12.5 mg daily  Procedures  DCCV: N/A  Ablation: N/A    He states that generally he feels well.  He walks 3 to 4 miles a day and feels good with activity.  He has felt a bit \"off\" at rest.  He also notes his blood pressure has been elevated for the last couple of weeks; his lisinopril was increased few days ago and blood pressure has been better, 120s/70s this morning.  He has been checking his rhythm using his smart watch which primarily read sinus rhythm, occasionally A-fib in the 80s.  He states he is not specifically aware of arrhythmia.  He denies chest discomfort, palpitations, abdominal fullness/bloating or peripheral edema, shortness of breath, paroxysmal nocturnal dyspnea, orthopnea, lightheadedness, dizziness, pre-syncope, or syncope.    Cardiographics (EKG personally reviewed):  EKG done 8/31/2023 shows atrial fibrillation with rapid ventricular response at 131 bpm    Watch ECG this morning shows A-fib in the 80s, appears to convert to sinus rhythm during the recording.    MARIELA:  Cardiac event monitoring from 7/27/2023 to 8/9/2023 (monitored duration 12d 17h 14m).  Baseline rhythm was sinus rhythm 86bpm.    Reported heart rate range 50 to 120bpm, average 76bpm.  1 symptom triggers correlated to sinus rhythm 84bpm.  No tachyarrhythmias.  No atrial fibrillation.  Intermittent first degree AV block.  There were no pauses noted.  Supraventricular and ventricular " "ectopic beat frequency are not reported on this monitoring modality.      ECHO done 8/28/2023 ([facility]):    NM treadmill stress test done 9/18/2023:    The exercise nuclear stress test is negative for inducible myocardial ischemia or infarction.    The exercise stress electrocardiogram is negative for inducible ischemic EKG changes.    The patient's exercise capacity is average for age and gender. The patient exercised for 8:59 minutes on the Amado protocol, achieving 10.3 METs and 91% the age-predicted maximum heart rate. The patient had no symptoms.    The left ventricular ejection fraction at stress is greater than 70%.    The patient is at a low risk of future cardiac ischemic events.    There is no prior study for comparison.    I have reviewed and updated the patient's Past Medical History, Social History, Family History and Medication List.  Outside records reviewed.     Physical Examination  Review of Systems   Vitals: BP (!) 162/70 (BP Location: Left arm, Patient Position: Sitting, Cuff Size: Adult Large)   Pulse 78   Resp 16   Ht 1.778 m (5' 10\")   Wt 107.5 kg (237 lb)   BMI 34.01 kg/m    BMI= Body mass index is 34.01 kg/m .  Wt Readings from Last 3 Encounters:   11/13/23 107.5 kg (237 lb)   09/14/23 110.3 kg (243 lb 1.6 oz)       General Appearance:   Alert, well-appearing and in no acute distress.   HEENT: Atraumatic, normocephalic.  No scleral icterus, normal conjunctivae, EOMs intact, PERRL.  Mucous membranes pink and moist.     Chest/Lungs:   Chest symmetric, spine straight.  Respirations unlabored.  Lungs are clear to auscultation.   Cardiovascular:   Regular rate and rhythm.  Normal first and second heart sounds with no murmurs, rubs, or gallops; radial and posterior tibial pulses are intact, no edema.   Abdomen:  Soft, nondistended, bowel sounds present.   Extremities: No cyanosis or clubbing.   Musculoskeletal: Moves all extremities.  Gait steady.   Skin: Warm, dry, intact.    Neurologic: Mood " and affect are appropriate.  Alert and oriented to person, place, time, and situation.     ROS: 10 point ROS neg other than the symptoms noted above in the HPI.        Medical History  Surgical History Family History Social History   Past Medical History:   Diagnosis Date    Benign essential hypertension 2023    Bilateral carotid artery stenosis 2023    Diverticulosis 2023    Elevated coronary artery calcium score 2023    Elevated PSA 2023    Hyperlipidemia, unspecified hyperlipidemia type 2023    Paroxysmal atrial fibrillation (H) 2023    Type 2 diabetes mellitus without complication, without long-term current use of insulin (H) 2023     Past Surgical History:   Procedure Laterality Date    CAROTID ENDARTERECTOMY Left 2022    INGUINAL HERNIA REPAIR      UMBILICAL HERNIA REPAIR       History reviewed. No pertinent family history.     Social History     Socioeconomic History    Marital status:      Spouse name: Not on file    Number of children: Not on file    Years of education: Not on file    Highest education level: Not on file   Occupational History    Not on file   Tobacco Use    Smoking status: Former     Types: Cigarettes     Quit date:      Years since quittin.8    Smokeless tobacco: Never   Substance and Sexual Activity    Alcohol use: Not on file    Drug use: Never    Sexual activity: Not on file   Other Topics Concern    Not on file   Social History Narrative    Not on file     Social Determinants of Health     Financial Resource Strain: Not on file   Food Insecurity: Not on file   Transportation Needs: Not on file   Physical Activity: Not on file   Stress: Not on file   Social Connections: Not on file   Interpersonal Safety: Not on file   Housing Stability: Not on file           Medications  Allergies   Current Outpatient Medications   Medication Sig Dispense Refill    ELIQUIS ANTICOAGULANT 5 MG tablet 1 tab(s) orally twice a day 180  "tablet 3    fluticasone (FLONASE) 50 MCG/ACT nasal spray Spray 1 spray into both nostrils daily      latanoprost (XALATAN) 0.005 % ophthalmic solution Place 1 drop into both eyes At Bedtime      lisinopril (ZESTRIL) 40 MG tablet Take 40 mg by mouth daily      metoprolol succinate ER (TOPROL XL) 25 MG 24 hr tablet Take 1 tablet (25 mg) by mouth daily 90 tablet 3    rosuvastatin (CRESTOR) 20 MG tablet Take 20 mg by mouth daily       No Known Allergies       Lab Results    Chemistry/lipid CBC Cardiac Enzymes/BNP/TSH/INR   Recent Labs   Lab Test 05/31/23  0933   CHOL 139   HDL 53   LDL 63   TRIG 113     Recent Labs   Lab Test 05/31/23  0933 12/05/22  0910 09/28/22  1407   LDL 63 44 100     Recent Labs   Lab Test 08/11/23  1314      POTASSIUM 4.7   CHLORIDE 102   CO2 21*   *   BUN 14.8   CR 1.05   GFRESTIMATED 77   MEREDITH 9.3     Recent Labs   Lab Test 08/11/23  1314 07/23/23  1153 05/31/23  0933   CR 1.05 1.11 1.04     Recent Labs   Lab Test 12/06/19  1120   A1C 6.4*    No results for input(s): \"WBC\", \"HGB\", \"HCT\", \"MCV\", \"PLT\" in the last 40763 hours.  No results for input(s): \"HGB\" in the last 97765 hours. No results for input(s): \"TROPONINI\" in the last 91129 hours.  No results for input(s): \"BNP\", \"NTBNPI\", \"NTBNP\" in the last 24567 hours.  Recent Labs   Lab Test 08/11/23  1314   TSH 2.18     No results for input(s): \"INR\" in the last 29005 hours.   52 minutes were spent on the date of encounter performing chart review, history and exam, documentation, and further activities as noted above.                                       "

## 2023-12-13 ENCOUNTER — HOSPITAL ENCOUNTER (OUTPATIENT)
Dept: CARDIOLOGY | Facility: HOSPITAL | Age: 68
Discharge: HOME | End: 2023-12-13
Attending: NURSE PRACTITIONER
Payer: COMMERCIAL

## 2023-12-13 DIAGNOSIS — I48.0 PAROXYSMAL ATRIAL FIBRILLATION (H): ICD-10-CM

## 2023-12-13 PROCEDURE — 999N000096 CARDIAC MOBILE TELEMETRY MONITOR

## 2023-12-24 ENCOUNTER — HEALTH MAINTENANCE LETTER (OUTPATIENT)
Age: 68
End: 2023-12-24

## 2023-12-29 PROCEDURE — 93228 REMOTE 30 DAY ECG REV/REPORT: CPT | Performed by: INTERNAL MEDICINE

## 2024-02-14 ENCOUNTER — OFFICE VISIT (OUTPATIENT)
Dept: CARDIOLOGY | Facility: CLINIC | Age: 69
End: 2024-02-14
Payer: COMMERCIAL

## 2024-02-14 VITALS
SYSTOLIC BLOOD PRESSURE: 146 MMHG | BODY MASS INDEX: 33.86 KG/M2 | RESPIRATION RATE: 16 BRPM | HEART RATE: 65 BPM | WEIGHT: 236 LBS | DIASTOLIC BLOOD PRESSURE: 72 MMHG

## 2024-02-14 DIAGNOSIS — I10 BENIGN ESSENTIAL HYPERTENSION: ICD-10-CM

## 2024-02-14 DIAGNOSIS — I48.0 PAROXYSMAL ATRIAL FIBRILLATION (H): Primary | ICD-10-CM

## 2024-02-14 DIAGNOSIS — R93.1 ELEVATED CORONARY ARTERY CALCIUM SCORE: ICD-10-CM

## 2024-02-14 PROCEDURE — 99214 OFFICE O/P EST MOD 30 MIN: CPT | Performed by: NURSE PRACTITIONER

## 2024-02-14 PROCEDURE — G2211 COMPLEX E/M VISIT ADD ON: HCPCS | Performed by: NURSE PRACTITIONER

## 2024-02-14 RX ORDER — APIXABAN 5 MG/1
5 TABLET, FILM COATED ORAL EVERY 12 HOURS
Qty: 180 TABLET | Refills: 3 | Status: SHIPPED | OUTPATIENT
Start: 2024-02-14 | End: 2024-03-18

## 2024-02-14 NOTE — PATIENT INSTRUCTIONS
Kael Dubois,    It was a pleasure to see you today at the [facility].     My recommendations after this visit include:    Continue current medications    Continue monitoring heart rate and rhythm using your Galaxy watch  Call if you have frequent or prolonged episodes of A fib    Follow up in 1 year, or sooner if needed    Lashell Anthony, [facility], Electrophysiology  [phone]  EP nurses [phone]

## 2024-02-14 NOTE — PROGRESS NOTES
HEART CARE ELECTROPHYSIOLOGY NOTE      [facility]  [phone]        Assessment/Recommendations   Assessment/Plan:  1.  Paroxysmal Atrial Fibrillation: No symptomatology or evidence of AF recurrence.  We had a lengthy discussion of the physiology and natural progression of atrial fibrillation and treatment options including rate control versus rhythm control with medications or pulmonary vein isolation ablation.  He appears to be a good candidate for ablation.  Discussed avoidance of possible triggers.    -- Continue metoprolol succinate 25 mg daily  -- Monitor using kenxus watch with ECG.  He is to call for frequent or prolonged episodes of AF.    He was reassured that atrial fibrillation is not life-threatening, but carries an increased risk for stroke.  He has a [id number] score of 4 for age 65-74, HTN, vascular disease, DM .    --Continue Eliquis 5 mg twice daily for stroke prophylaxis.    2.  Hypertension: Controlled on metoprolol XL and lisinopril..    3.  Mild obstructive sleep apnea: We discussed the correlation between untreated sleep apnea and atrial fibrillation.  Sleep study done, his follow-up visit in the next couple of weeks.  Recommend treatment if indicated.    Follow up in 1 year     History of Present Illness/Subjective    HPI: Kael Dubois is a 68 year old male who comes in today for EP follow-up of atrial fibrillation.  He has a history of paroxysmal atrial fibrillation, hypertension, hyperlipidemia, carotid stenosis s/p left CEA 9/1/2022, type 2 diabetes, possible sleep apnea.  COVID-19 infection in January 2024.    Arrhythmia history  Dx/date: Paroxysmal atrial fibrillation diagnosed 8/11/2023, though episodes of irregular heart rate on his blood pressure monitor for about 2 months prior.  He believes episodes were triggered by consuming a large quantity of black licorice and taking pseudoephedrine.  Sx: Indeterminate  [id number] score: 4 for age 65-74, HTN,  vascular disease, DM  Oral anticoagulation: Eliquis 5 mg twice daily  Antiarrhythmic medications, AV danielle blocking agents: Metoprolol succinate 12.5 mg daily  Procedures  DCCV: N/A  Ablation: N/A    He states that generally he feels well.  He continues to keep active and walks 3 to 4 miles a day without any decrease in activity tolerance.  He monitors his heart rate with his Galaxy watch, no inappropriate elevations.  Blood pressures consistently 120s/70s with resting heart rates in the 60s.  He denies chest discomfort, palpitations, abdominal fullness/bloating or peripheral edema, shortness of breath, paroxysmal nocturnal dyspnea, orthopnea, lightheadedness, dizziness, pre-syncope, or syncope.  He has been sleeping better since he elevated the head of his bed, no nocturnal desaturations noted on his watch.    Cardiographics (EKG and MCOT personally reviewed):  EKG done 8/31/2023 shows atrial fibrillation with rapid ventricular response at 131 bpm    Galaxy watch ECG 11/13/2023 shows A-fib in the 80s, appears to convert to sinus rhythm during the recording.    MCOT worn 12/13/2023 to 12/26/2023 shows sinus rhythm with mild first-degree AV delay.  Average heart rate of 66 bpm and a range of 47 to 119 bpm.  No significant bradycardia or pauses.  No atrial fibrillation or other tachyarrhythmias.  No significant ventricular ectopy.    MARIELA:  Cardiac event monitoring from 7/27/2023 to 8/9/2023 (monitored duration 12d 17h 14m).  Baseline rhythm was sinus rhythm 86bpm.    Reported heart rate range 50 to 120bpm, average 76bpm.  1 symptom triggers correlated to sinus rhythm 84bpm.  No tachyarrhythmias.  No atrial fibrillation.  Intermittent first degree AV block.  There were no pauses noted.  Supraventricular and ventricular ectopic beat frequency are not reported on this monitoring modality.      ECHO done 8/28/2023 ([facility]):    NM treadmill stress test done 9/18/2023:    The exercise nuclear stress test is negative for  inducible myocardial ischemia or infarction.    The exercise stress electrocardiogram is negative for inducible ischemic EKG changes.    The patient's exercise capacity is average for age and gender. The patient exercised for 8:59 minutes on the Amado protocol, achieving 10.3 METs and 91% the age-predicted maximum heart rate. The patient had no symptoms.    The left ventricular ejection fraction at stress is greater than 70%.    The patient is at a low risk of future cardiac ischemic events.    There is no prior study for comparison.    I have reviewed and updated the patient's Past Medical History, Social History, Family History and Medication List.  Outside records reviewed.     Physical Examination  Review of Systems   Vitals: BP (!) 146/72 (BP Location: Right arm, Patient Position: Sitting, Cuff Size: Adult Regular)   Pulse 65   Resp 16   Wt 107 kg (236 lb)   BMI 33.86 kg/m    BMI= Body mass index is 33.86 kg/m .  Wt Readings from Last 3 Encounters:   02/14/24 107 kg (236 lb)   11/13/23 107.5 kg (237 lb)   09/14/23 110.3 kg (243 lb 1.6 oz)       General Appearance:   Alert, well-appearing and in no acute distress.   HEENT: Atraumatic, normocephalic.  No scleral icterus, normal conjunctivae, EOMs intact, PERRL.  Mucous membranes pink and moist.     Chest/Lungs:   Chest symmetric, spine straight.  Respirations unlabored.  Lungs are clear to auscultation.   Cardiovascular:   Regular rate and rhythm.  Normal first and second heart sounds with no murmurs, rubs, or gallops; radial pulses are intact, no edema.   Abdomen:  Soft, nondistended.   Extremities: No cyanosis or clubbing.   Musculoskeletal: Moves all extremities.  Gait steady.   Skin: Warm, dry, intact.    Neurologic: Mood and affect are appropriate.  Alert and oriented to person, place, time, and situation.     ROS: 10 point ROS neg other than the symptoms noted above in the HPI.        Medical History  Surgical History Family History Social History    Past Medical History:   Diagnosis Date    Benign essential hypertension 2023    Bilateral carotid artery stenosis 2023    Diverticulosis 2023    Elevated coronary artery calcium score 2023    Elevated PSA 2023    Hyperlipidemia, unspecified hyperlipidemia type 2023    Paroxysmal atrial fibrillation (H) 2023    Type 2 diabetes mellitus without complication, without long-term current use of insulin (H) 2023     Past Surgical History:   Procedure Laterality Date    CAROTID ENDARTERECTOMY Left 2022    INGUINAL HERNIA REPAIR      UMBILICAL HERNIA REPAIR       History reviewed. No pertinent family history.     Social History     Socioeconomic History    Marital status:      Spouse name: Not on file    Number of children: Not on file    Years of education: Not on file    Highest education level: Not on file   Occupational History    Not on file   Tobacco Use    Smoking status: Former     Types: Cigarettes     Quit date:      Years since quittin.1    Smokeless tobacco: Never   Substance and Sexual Activity    Alcohol use: Not on file    Drug use: Never    Sexual activity: Not on file   Other Topics Concern    Not on file   Social History Narrative    Not on file     Social Determinants of Health     Financial Resource Strain: Not on file   Food Insecurity: Not on file   Transportation Needs: Not on file   Physical Activity: Not on file   Stress: Not on file   Social Connections: Not on file   Interpersonal Safety: Not on file   Housing Stability: Not on file           Medications  Allergies   Current Outpatient Medications   Medication Sig Dispense Refill    ELIQUIS ANTICOAGULANT 5 MG tablet Take 1 tablet (5 mg) by mouth every 12 hours 180 tablet 3    fluticasone (FLONASE) 50 MCG/ACT nasal spray Spray 1 spray into both nostrils daily as needed      latanoprost (XALATAN) 0.005 % ophthalmic solution Place 1 drop into both eyes At Bedtime      lisinopril  "(ZESTRIL) 40 MG tablet Take 40 mg by mouth daily      metoprolol succinate ER (TOPROL XL) 25 MG 24 hr tablet Take 1 tablet (25 mg) by mouth daily 90 tablet 3    rosuvastatin (CRESTOR) 20 MG tablet Take 20 mg by mouth daily       No Known Allergies       Lab Results    Chemistry/lipid CBC Cardiac Enzymes/BNP/TSH/INR   Recent Labs   Lab Test 05/31/23  0933   CHOL 139   HDL 53   LDL 63   TRIG 113     Recent Labs   Lab Test 05/31/23  0933 12/05/22  0910 09/28/22  1407   LDL 63 44 100     Recent Labs   Lab Test 08/11/23  1314      POTASSIUM 4.7   CHLORIDE 102   CO2 21*   *   BUN 14.8   CR 1.05   GFRESTIMATED 77   MEREDITH 9.3     Recent Labs   Lab Test 08/11/23  1314 07/23/23  1153 05/31/23  0933   CR 1.05 1.11 1.04      No results for input(s): \"WBC\", \"HGB\", \"HCT\", \"MCV\", \"PLT\" in the last 98455 hours.  No results for input(s): \"HGB\" in the last 76727 hours.   Recent Labs   Lab Test 08/11/23  1314   TSH 2.18        The longitudinal plan of care for the condition(s) below were addressed during this visit. Due to the added complexity in care, I will continue to support Kael in the subsequent management of this condition(s) and with the ongoing continuity of care of this condition(s).    Problem List Items Addressed This Visit as of 2/14/2024         Circulatory    Benign essential hypertension    Elevated coronary artery calcium score    Paroxysmal atrial fibrillation (H) - Primary                                             " Island Pedicle Flap Text: We discussed various closure modalities with the patient, including healing by second intention, primary closure, skin graft and various flaps.  The location and configuration of the defect indicated that a island pedicle flap would result in the least disturbance of the position and function of the surrounding anatomic structures, and provide the best result.  The technique, its benefits, alternatives and risks were discussed with the patient.  The patient underwent the procedure as follows: The patient was positioned supine on the operating room table.  The area of the defect and the surrounding skin were anesthetized with buffered 1% lidocaine with epinephrine.  The area was washed with chlorhexidine.  Sterile drapes were applied. \\n\\nThe wound edges were debeveled and extensively undermined.    \\nAn island pedicle flap was designed and incised down to subcutaneous fat, severing the flap entirely from surrounding epidermal and dermal attachments.  Careful deep undermining was performed to create a single subcutaneous pedicle.  The deepest portion of the subcutaneous pedicle was angled toward the primary defect in order to create hinge-mobility for advancement of the flap.  Extreme care was taken to preserve a portion of the axial plexus to optimize adequate vascular supply to the flap.  Hemostasis was achieved with spot electrodesiccation.  The island pedicle flap was advanced into position to cover the primary defect and a key suture was placed to close the secondary defect.  The flap was trimmed to fit the contour of the primary defect.  Additional buried interrupted sutures were used to secure the flap into place and close the secondary defect created by the flap advancement.  Final cutaneous approximation was achieved.  The closure was manually tested and found to be sound for strength and hemostasis.

## 2024-02-14 NOTE — LETTER
2/14/2024    Amado Baez MD  [address] [address]    RE: Kael Dubois       Dear Colleague,     I had the pleasure of seeing Kael Dub[facility] in the [facility].    HEART CARE ELECTROPHYSIOLOGY NOTE      [facility]  [phone]        Assessment/Recommendations   Assessment/Plan:  1.  Paroxysmal Atrial Fibrillation: No symptomatology or evidence of AF recurrence.  We had a lengthy discussion of the physiology and natural progression of atrial fibrillation and treatment options including rate control versus rhythm control with medications or pulmonary vein isolation ablation.  He appears to be a good candidate for ablation.  Discussed avoidance of possible triggers.    -- Continue metoprolol succinate 25 mg daily  -- Monitor using [employer] watch with ECG.  He is to call for frequent or prolonged episodes of AF.    He was reassured that atrial fibrillation is not life-threatening, but carries an increased risk for stroke.  He has a [id number] score of 4 for age 65-74, HTN, vascular disease, DM .    --Continue Eliquis 5 mg twice daily for stroke prophylaxis.    2.  Hypertension: Controlled on metoprolol XL and lisinopril..    3.  Mild obstructive sleep apnea: We discussed the correlation between untreated sleep apnea and atrial fibrillation.  Sleep study done, his follow-up visit in the next couple of weeks.  Recommend treatment if indicated.    Follow up in 1 year     History of Present Illness/Subjective    HPI: Kael Dubois is a 68 year old male who comes in today for EP follow-up of atrial fibrillation.  He has a history of paroxysmal atrial fibrillation, hypertension, hyperlipidemia, carotid stenosis s/p left CEA 9/1/2022, type 2 diabetes, possible sleep apnea.  COVID-19 infection in January 2024.    Arrhythmia history  Dx/date: Paroxysmal atrial fibrillation diagnosed 8/11/2023, though episodes of irregular heart rate on his blood pressure monitor for  about 2 months prior.  He believes episodes were triggered by consuming a large quantity of black licorice and taking pseudoephedrine.  Sx: Indeterminate  [id number] score: 4 for age 65-74, HTN, vascular disease, DM  Oral anticoagulation: Eliquis 5 mg twice daily  Antiarrhythmic medications, AV danielle blocking agents: Metoprolol succinate 12.5 mg daily  Procedures  DCCV: N/A  Ablation: N/A    He states that generally he feels well.  He continues to keep active and walks 3 to 4 miles a day without any decrease in activity tolerance.  He monitors his heart rate with his Galaxy watch, no inappropriate elevations.  Blood pressures consistently 120s/70s with resting heart rates in the 60s.  He denies chest discomfort, palpitations, abdominal fullness/bloating or peripheral edema, shortness of breath, paroxysmal nocturnal dyspnea, orthopnea, lightheadedness, dizziness, pre-syncope, or syncope.  He has been sleeping better since he elevated the head of his bed, no nocturnal desaturations noted on his watch.    Cardiographics (EKG and MCOT personally reviewed):  EKG done 8/31/2023 shows atrial fibrillation with rapid ventricular response at 131 bpm    Galaxy watch ECG 11/13/2023 shows A-fib in the 80s, appears to convert to sinus rhythm during the recording.    MCOT worn 12/13/2023 to 12/26/2023 shows sinus rhythm with mild first-degree AV delay.  Average heart rate of 66 bpm and a range of 47 to 119 bpm.  No significant bradycardia or pauses.  No atrial fibrillation or other tachyarrhythmias.  No significant ventricular ectopy.    MARIELA:  Cardiac event monitoring from 7/27/2023 to 8/9/2023 (monitored duration 12d 17h 14m).  Baseline rhythm was sinus rhythm 86bpm.    Reported heart rate range 50 to 120bpm, average 76bpm.  1 symptom triggers correlated to sinus rhythm 84bpm.  No tachyarrhythmias.  No atrial fibrillation.  Intermittent first degree AV block.  There were no pauses noted.  Supraventricular and ventricular  ectopic beat frequency are not reported on this monitoring modality.      ECHO done 8/28/2023 ([facility]):    NM treadmill stress test done 9/18/2023:    The exercise nuclear stress test is negative for inducible myocardial ischemia or infarction.    The exercise stress electrocardiogram is negative for inducible ischemic EKG changes.    The patient's exercise capacity is average for age and gender. The patient exercised for 8:59 minutes on the Amado protocol, achieving 10.3 METs and 91% the age-predicted maximum heart rate. The patient had no symptoms.    The left ventricular ejection fraction at stress is greater than 70%.    The patient is at a low risk of future cardiac ischemic events.    There is no prior study for comparison.    I have reviewed and updated the patient's Past Medical History, Social History, Family History and Medication List.  Outside records reviewed.     Physical Examination  Review of Systems   Vitals: BP (!) 146/72 (BP Location: Right arm, Patient Position: Sitting, Cuff Size: Adult Regular)   Pulse 65   Resp 16   Wt 107 kg (236 lb)   BMI 33.86 kg/m    BMI= Body mass index is 33.86 kg/m .  Wt Readings from Last 3 Encounters:   02/14/24 107 kg (236 lb)   11/13/23 107.5 kg (237 lb)   09/14/23 110.3 kg (243 lb 1.6 oz)       General Appearance:   Alert, well-appearing and in no acute distress.   HEENT: Atraumatic, normocephalic.  No scleral icterus, normal conjunctivae, EOMs intact, PERRL.  Mucous membranes pink and moist.     Chest/Lungs:   Chest symmetric, spine straight.  Respirations unlabored.  Lungs are clear to auscultation.   Cardiovascular:   Regular rate and rhythm.  Normal first and second heart sounds with no murmurs, rubs, or gallops; radial pulses are intact, no edema.   Abdomen:  Soft, nondistended.   Extremities: No cyanosis or clubbing.   Musculoskeletal: Moves all extremities.  Gait steady.   Skin: Warm, dry, intact.    Neurologic: Mood and affect are appropriate.  Alert  and oriented to person, place, time, and situation.     ROS: 10 point ROS neg other than the symptoms noted above in the HPI.        Medical History  Surgical History Family History Social History   Past Medical History:   Diagnosis Date    Benign essential hypertension 2023    Bilateral carotid artery stenosis 2023    Diverticulosis 2023    Elevated coronary artery calcium score 2023    Elevated PSA 2023    Hyperlipidemia, unspecified hyperlipidemia type 2023    Paroxysmal atrial fibrillation (H) 2023    Type 2 diabetes mellitus without complication, without long-term current use of insulin (H) 2023     Past Surgical History:   Procedure Laterality Date    CAROTID ENDARTERECTOMY Left 2022    INGUINAL HERNIA REPAIR      UMBILICAL HERNIA REPAIR       History reviewed. No pertinent family history.     Social History     Socioeconomic History    Marital status:      Spouse name: Not on file    Number of children: Not on file    Years of education: Not on file    Highest education level: Not on file   Occupational History    Not on file   Tobacco Use    Smoking status: Former     Types: Cigarettes     Quit date:      Years since quittin.1    Smokeless tobacco: Never   Substance and Sexual Activity    Alcohol use: Not on file    Drug use: Never    Sexual activity: Not on file   Other Topics Concern    Not on file   Social History Narrative    Not on file     Social Determinants of Health     Financial Resource Strain: Not on file   Food Insecurity: Not on file   Transportation Needs: Not on file   Physical Activity: Not on file   Stress: Not on file   Social Connections: Not on file   Interpersonal Safety: Not on file   Housing Stability: Not on file           Medications  Allergies   Current Outpatient Medications   Medication Sig Dispense Refill    ELIQUIS ANTICOAGULANT 5 MG tablet Take 1 tablet (5 mg) by mouth every 12 hours 180 tablet 3     "fluticasone (FLONASE) 50 MCG/ACT nasal spray Spray 1 spray into both nostrils daily as needed      latanoprost (XALATAN) 0.005 % ophthalmic solution Place 1 drop into both eyes At Bedtime      lisinopril (ZESTRIL) 40 MG tablet Take 40 mg by mouth daily      metoprolol succinate ER (TOPROL XL) 25 MG 24 hr tablet Take 1 tablet (25 mg) by mouth daily 90 tablet 3    rosuvastatin (CRESTOR) 20 MG tablet Take 20 mg by mouth daily       No Known Allergies       Lab Results    Chemistry/lipid CBC Cardiac Enzymes/BNP/TSH/INR   Recent Labs   Lab Test 05/31/23  0933   CHOL 139   HDL 53   LDL 63   TRIG 113     Recent Labs   Lab Test 05/31/23  0933 12/05/22  0910 09/28/22  1407   LDL 63 44 100     Recent Labs   Lab Test 08/11/23  1314      POTASSIUM 4.7   CHLORIDE 102   CO2 21*   *   BUN 14.8   CR 1.05   GFRESTIMATED 77   MEREDITH 9.3     Recent Labs   Lab Test 08/11/23  1314 07/23/23  1153 05/31/23  0933   CR 1.05 1.11 1.04      No results for input(s): \"WBC\", \"HGB\", \"HCT\", \"MCV\", \"PLT\" in the last 69411 hours.  No results for input(s): \"HGB\" in the last 26541 hours.   Recent Labs   Lab Test 08/11/23  1314   TSH 2.18        The longitudinal plan of care for the condition(s) below were addressed during this visit. Due to the added complexity in care, I will continue to support Kael in the subsequent management of this condition(s) and with the ongoing continuity of care of this condition(s).    Problem List Items Addressed This Visit as of 2/14/2024         Circulatory    Benign essential hypertension    Elevated coronary artery calcium score    Paroxysmal atrial fibrillation (H) - Primary                  Thank you for allowing me to participate in the care of your patient.      Sincerely,     AARON Ceballos CNP      Heart Care  cc:   AARON Ceballos CNP  [address], [address]  [place] [address]      "

## 2024-02-21 ENCOUNTER — LAB REQUISITION (OUTPATIENT)
Dept: LAB | Facility: CLINIC | Age: 69
End: 2024-02-21

## 2024-02-21 DIAGNOSIS — E78.5 HYPERLIPIDEMIA, UNSPECIFIED: ICD-10-CM

## 2024-02-21 LAB
ANION GAP SERPL CALCULATED.3IONS-SCNC: 11 MMOL/L (ref 7–15)
BUN SERPL-MCNC: 20.8 MG/DL (ref 8–23)
CALCIUM SERPL-MCNC: 8.9 MG/DL (ref 8.8–10.2)
CHLORIDE SERPL-SCNC: 107 MMOL/L (ref 98–107)
CHOLEST SERPL-MCNC: 164 MG/DL
CREAT SERPL-MCNC: 1.08 MG/DL (ref 0.67–1.17)
DEPRECATED HCO3 PLAS-SCNC: 26 MMOL/L (ref 22–29)
EGFRCR SERPLBLD CKD-EPI 2021: 74 ML/MIN/1.73M2
FASTING STATUS PATIENT QL REPORTED: NORMAL
GLUCOSE SERPL-MCNC: 126 MG/DL (ref 70–99)
HDLC SERPL-MCNC: 59 MG/DL
LDLC SERPL CALC-MCNC: 89 MG/DL
NONHDLC SERPL-MCNC: 105 MG/DL
POTASSIUM SERPL-SCNC: 5.1 MMOL/L (ref 3.4–5.3)
SODIUM SERPL-SCNC: 144 MMOL/L (ref 135–145)
TRIGL SERPL-MCNC: 81 MG/DL

## 2024-02-21 PROCEDURE — 80061 LIPID PANEL: CPT | Performed by: FAMILY MEDICINE

## 2024-02-21 PROCEDURE — 80048 BASIC METABOLIC PNL TOTAL CA: CPT | Performed by: FAMILY MEDICINE

## 2024-03-18 ENCOUNTER — MYC REFILL (OUTPATIENT)
Dept: CARDIOLOGY | Facility: CLINIC | Age: 69
End: 2024-03-18
Payer: COMMERCIAL

## 2024-03-18 DIAGNOSIS — I48.0 PAROXYSMAL ATRIAL FIBRILLATION (H): ICD-10-CM

## 2024-03-18 RX ORDER — APIXABAN 5 MG/1
5 TABLET, FILM COATED ORAL EVERY 12 HOURS
Qty: 180 TABLET | Refills: 3 | Status: SHIPPED | OUTPATIENT
Start: 2024-03-18

## 2024-05-11 ENCOUNTER — HEALTH MAINTENANCE LETTER (OUTPATIENT)
Age: 69
End: 2024-05-11

## 2024-06-26 ENCOUNTER — LAB REQUISITION (OUTPATIENT)
Dept: LAB | Facility: CLINIC | Age: 69
End: 2024-06-26

## 2024-06-26 PROCEDURE — 87798 DETECT AGENT NOS DNA AMP: CPT | Performed by: FAMILY MEDICINE

## 2024-06-27 LAB
A PHAGOCYTOPH DNA BLD QL NAA+PROBE: NOT DETECTED
BABESIA DNA BLD QL NAA+PROBE: NOT DETECTED
EHRLICHIA DNA SPEC QL NAA+PROBE: NOT DETECTED

## 2024-09-05 ENCOUNTER — LAB REQUISITION (OUTPATIENT)
Dept: LAB | Facility: CLINIC | Age: 69
End: 2024-09-05

## 2024-09-05 DIAGNOSIS — E11.51 TYPE 2 DIABETES MELLITUS WITH DIABETIC PERIPHERAL ANGIOPATHY WITHOUT GANGRENE (H): ICD-10-CM

## 2024-09-05 DIAGNOSIS — R97.20 ELEVATED PROSTATE SPECIFIC ANTIGEN (PSA): ICD-10-CM

## 2024-09-05 DIAGNOSIS — D64.9 ANEMIA, UNSPECIFIED: ICD-10-CM

## 2024-09-05 DIAGNOSIS — E78.5 HYPERLIPIDEMIA, UNSPECIFIED: ICD-10-CM

## 2024-09-05 DIAGNOSIS — I11.9 HYPERTENSIVE HEART DISEASE WITHOUT HEART FAILURE: ICD-10-CM

## 2024-09-05 LAB
ANION GAP SERPL CALCULATED.3IONS-SCNC: 10 MMOL/L (ref 7–15)
BUN SERPL-MCNC: 19.3 MG/DL (ref 8–23)
CALCIUM SERPL-MCNC: 9 MG/DL (ref 8.8–10.4)
CHLORIDE SERPL-SCNC: 103 MMOL/L (ref 98–107)
CHOLEST SERPL-MCNC: 147 MG/DL
CREAT SERPL-MCNC: 1.2 MG/DL (ref 0.67–1.17)
CREAT UR-MCNC: 38.1 MG/DL
EGFRCR SERPLBLD CKD-EPI 2021: 65 ML/MIN/1.73M2
FASTING STATUS PATIENT QL REPORTED: ABNORMAL
FASTING STATUS PATIENT QL REPORTED: NORMAL
GLUCOSE SERPL-MCNC: 127 MG/DL (ref 70–99)
HCO3 SERPL-SCNC: 25 MMOL/L (ref 22–29)
HDLC SERPL-MCNC: 54 MG/DL
IRON BINDING CAPACITY (ROCHE): 273 UG/DL (ref 240–430)
IRON SATN MFR SERPL: 28 % (ref 15–46)
IRON SERPL-MCNC: 77 UG/DL (ref 61–157)
LDLC SERPL CALC-MCNC: 77 MG/DL
MICROALBUMIN UR-MCNC: <12 MG/L
MICROALBUMIN/CREAT UR: NORMAL MG/G{CREAT}
NONHDLC SERPL-MCNC: 93 MG/DL
POTASSIUM SERPL-SCNC: 5.1 MMOL/L (ref 3.4–5.3)
PSA SERPL DL<=0.01 NG/ML-MCNC: 4.5 NG/ML (ref 0–6.5)
SODIUM SERPL-SCNC: 138 MMOL/L (ref 135–145)
TRIGL SERPL-MCNC: 78 MG/DL

## 2024-09-05 PROCEDURE — 82043 UR ALBUMIN QUANTITATIVE: CPT | Performed by: FAMILY MEDICINE

## 2024-09-05 PROCEDURE — 80048 BASIC METABOLIC PNL TOTAL CA: CPT | Performed by: FAMILY MEDICINE

## 2024-09-05 PROCEDURE — 84153 ASSAY OF PSA TOTAL: CPT | Performed by: FAMILY MEDICINE

## 2024-09-05 PROCEDURE — 83550 IRON BINDING TEST: CPT | Performed by: FAMILY MEDICINE

## 2024-09-05 PROCEDURE — 80061 LIPID PANEL: CPT | Performed by: FAMILY MEDICINE

## 2024-09-28 ENCOUNTER — HEALTH MAINTENANCE LETTER (OUTPATIENT)
Age: 69
End: 2024-09-28

## 2024-12-07 ENCOUNTER — HEALTH MAINTENANCE LETTER (OUTPATIENT)
Age: 69
End: 2024-12-07

## 2024-12-19 ENCOUNTER — MYC REFILL (OUTPATIENT)
Dept: CARDIOLOGY | Facility: CLINIC | Age: 69
End: 2024-12-19
Payer: COMMERCIAL

## 2024-12-19 DIAGNOSIS — I48.0 PAROXYSMAL ATRIAL FIBRILLATION (H): ICD-10-CM

## 2024-12-19 RX ORDER — METOPROLOL SUCCINATE 25 MG/1
25 TABLET, EXTENDED RELEASE ORAL DAILY
Qty: 90 TABLET | Refills: 3 | Status: SHIPPED | OUTPATIENT
Start: 2024-12-19

## 2024-12-19 RX ORDER — APIXABAN 5 MG/1
5 TABLET, FILM COATED ORAL EVERY 12 HOURS
Qty: 180 TABLET | Refills: 3 | Status: SHIPPED | OUTPATIENT
Start: 2024-12-19

## 2025-01-18 ENCOUNTER — HEALTH MAINTENANCE LETTER (OUTPATIENT)
Age: 70
End: 2025-01-18

## 2025-02-27 ENCOUNTER — OFFICE VISIT (OUTPATIENT)
Dept: CARDIOLOGY | Facility: CLINIC | Age: 70
End: 2025-02-27
Payer: COMMERCIAL

## 2025-02-27 VITALS
BODY MASS INDEX: 34.01 KG/M2 | RESPIRATION RATE: 14 BRPM | HEART RATE: 80 BPM | DIASTOLIC BLOOD PRESSURE: 70 MMHG | SYSTOLIC BLOOD PRESSURE: 130 MMHG | WEIGHT: 237 LBS

## 2025-02-27 DIAGNOSIS — I10 BENIGN ESSENTIAL HYPERTENSION: ICD-10-CM

## 2025-02-27 DIAGNOSIS — I48.0 PAROXYSMAL ATRIAL FIBRILLATION (H): Primary | ICD-10-CM

## 2025-02-27 RX ORDER — ROSUVASTATIN CALCIUM 40 MG/1
1 TABLET, COATED ORAL
COMMUNITY
Start: 2025-02-13

## 2025-02-27 RX ORDER — LISINOPRIL 20 MG/1
1 TABLET ORAL 2 TIMES DAILY
COMMUNITY
Start: 2025-02-02

## 2025-02-27 RX ORDER — METFORMIN HYDROCHLORIDE 500 MG/1
2-4 TABLET, EXTENDED RELEASE ORAL
COMMUNITY

## 2025-02-27 NOTE — LETTER
2/27/2025    Amado Baez MD  [address] [address]    RE: Kael Dubois       Dear Colleague,     I had the [facility].    HEART CARE ELECTROPHYSIOLOGY NOTE      [facility]  [phone]        Assessment/Recommendations   Assessment/Plan:  1.  Paroxysmal Atrial Fibrillation: 2 episodes of A Fib-RVR (9/2024, 2/2025) lasting ~24 hours.  We had a lengthy discussion of the physiology and natural progression of atrial fibrillation and treatment options with medications or pulmonary vein isolation ablation.  He appears to be a good candidate for ablation.  We further discussed the ablation procedure, risks, expected recovery.  He was given information to review at home.  He is potentially interested in pursuing catheter ablation.  Briefly discussed concomitant PVI +pLAAC.  Discussed avoidance of possible triggers.    -- Continue metoprolol succinate 25 mg daily.  Take an extra 25 mg at onset of AF  -- Monitor using [employer] device.  He is to call for frequent or prolonged episodes of AF.  -- Consideration for catheter ablation    He was reassured that atrial fibrillation is not life-threatening, but carries an increased risk for stroke.  He has a [id number] score of 4 for age 65-74, HTN, vascular disease, DM.  He denies bleeding issues.    --Continue Eliquis 5 mg twice daily for stroke prophylaxis.    2.  Hypertension: Controlled on metoprolol XL and lisinopril..    Follow up with Dr. Fields or Dr. Duncan to further discuss PVI     History of Present Illness/Subjective    HPI: Kael Dubois is a 69 year old male who comes in today for EP follow-up of atrial fibrillation.  He has a history of paroxysmal atrial fibrillation, hypertension, hyperlipidemia, carotid stenosis s/p left CEA 9/1/2022, type 2 diabetes, possible sleep apnea.  COVID-19 infection in January 2024.    Arrhythmia history  Dx/date: Paroxysmal atrial  fibrillation diagnosed 8/11/2023, though episodes of irregular heart rate on his blood pressure monitor for about 2 months prior.  He believes episodes were triggered by consuming a large quantity of black licorice and taking pseudoephedrine.  Sx: Mild awareness of arrhythmia  [id number] score: 4 for age 65-74, HTN, vascular disease, DM  Oral anticoagulation: Eliquis 5 mg twice daily  Antiarrhythmic medications, AV danielle blocking agents: Metoprolol succinate 12.5 mg daily  Procedures  DCCV: N/A  Ablation: N/A    He states that generally he feels well.  He remains active and has been careful to hydrate more aggressively.  He monitors his heart rate with his Galaxy watch and Kardia device.  He had A fib 9/2024 and 2/26/2025, both sounds lasting for 1 day, rates 120s-130s.  Blood pressures consistently 110s/70s with resting heart rates in the 50s-60s.  He denies chest discomfort, palpitations, abdominal fullness/bloating or peripheral edema, shortness of breath, paroxysmal nocturnal dyspnea, orthopnea, lightheadedness, dizziness, pre-syncope, or syncope.  He reports sleeping well, no nocturnal desaturations noted on his watch.    Cardiographics (EKG and Kardia strips personally reviewed):  EKG done 8/31/2023 shows atrial fibrillation with rapid ventricular response at 131 bpm    Kardia ECG strips from 2/26/2025 & 2/27/2025 show A-fib with RVR up to 130s followed by sinus rhythm 80s.    MCOT worn 12/13/2023 to 12/26/2023 shows sinus rhythm with mild first-degree AV delay.  Average heart rate of 66 bpm and a range of 47 to 119 bpm.  No significant bradycardia or pauses.  No atrial fibrillation or other tachyarrhythmias.  No significant ventricular ectopy.    MARIELA:  Cardiac event monitoring from 7/27/2023 to 8/9/2023 (monitored duration 12d 17h 14m).  Baseline rhythm was sinus rhythm 86bpm.    Reported heart rate range 50 to 120bpm, average 76bpm.  1 symptom triggers correlated to sinus rhythm 84bpm.  No  tachyarrhythmias.  No atrial fibrillation.  Intermittent first degree AV block.  There were no pauses noted.  Supraventricular and ventricular ectopic beat frequency are not reported on this monitoring modality.      ECHO done 8/28/2023 ([facility]):    NM treadmill stress test done 9/18/2023:     The exercise nuclear stress test is negative for inducible myocardial ischemia or infarction.     The exercise stress electrocardiogram is negative for inducible ischemic EKG changes.     The patient's exercise capacity is average for age and gender. The patient exercised for 8:59 minutes on the Amado protocol, achieving 10.3 METs and 91% the age-predicted maximum heart rate. The patient had no symptoms.     The left ventricular ejection fraction at stress is greater than 70%.     The patient is at a low risk of future cardiac ischemic events.     There is no prior study for comparison.    I have reviewed and updated the patient's Past Medical History, Social History, Family History and Medication List.  Outside records reviewed.     Physical Examination  Review of Systems   Vitals: /70 (BP Location: Left arm, Patient Position: Sitting, Cuff Size: Adult Large)   Pulse 80   Resp 14   Wt 107.5 kg (237 lb)   BMI 34.01 kg/m    BMI= Body mass index is 34.01 kg/m .  Wt Readings from Last 3 Encounters:   02/27/25 107.5 kg (237 lb)   02/14/24 107 kg (236 lb)   11/13/23 107.5 kg (237 lb)       General Appearance:   Alert, well-appearing and in no acute distress.   HEENT: Atraumatic, normocephalic.  No scleral icterus, normal conjunctivae, EOMs intact, PERRL.  Mucous membranes pink and moist.     Chest/Lungs:   Chest symmetric, spine straight.  Respirations unlabored.  Lungs are clear to auscultation.   Cardiovascular:   Regular rate and rhythm.  Normal first and second heart sounds with no murmurs, rubs, or gallops; radial pulses are intact, no edema.   Abdomen:  Soft, nondistended.   Extremities: No cyanosis or clubbing.    Musculoskeletal: Moves all extremities.     Skin: Warm, dry, intact.    Neurologic: Mood and affect are appropriate.  Alert and oriented to person, place, time, and situation.     ROS: 10 point ROS neg other than the symptoms noted above in the HPI.        Medical History  Surgical History Family History Social History   Past Medical History:   Diagnosis Date     Benign essential hypertension 2023     Bilateral carotid artery stenosis 2023     Diverticulosis 2023     Elevated coronary artery calcium score 2023     Elevated PSA 2023     Hyperlipidemia, unspecified hyperlipidemia type 2023     Paroxysmal atrial fibrillation (H) 2023     Type 2 diabetes mellitus without complication, without long-term current use of insulin (H) 2023     Past Surgical History:   Procedure Laterality Date     CAROTID ENDARTERECTOMY Left 2022     INGUINAL HERNIA REPAIR       UMBILICAL HERNIA REPAIR       History reviewed. No pertinent family history.     Social History     Socioeconomic History     Marital status:      Spouse name: Not on file     Number of children: Not on file     Years of education: Not on file     Highest education level: Not on file   Occupational History     Not on file   Tobacco Use     Smoking status: Former     Current packs/day: 0.00     Types: Cigarettes     Quit date:      Years since quittin.1     Smokeless tobacco: Never   Substance and Sexual Activity     Alcohol use: Not on file     Drug use: Never     Sexual activity: Not on file   Other Topics Concern     Not on file   Social History Narrative     Not on file     Social Drivers of Health     Financial Resource Strain: Not on file   Food Insecurity: Not on file   Transportation Needs: Not on file   Physical Activity: Not on file   Stress: Not on file   Social Connections: Unknown (2022)    [facility]    Social Connections      Frequency of Communication with Friends and Family: Not on file   Interpersonal Safety: Not on file   Housing Stability: Not on file           Medications  Allergies   Current Outpatient Medications   Medication Sig Dispense Refill     ELIQUIS ANTICOAGULANT 5 MG tablet Take 1 tablet (5 mg) by mouth every 12 hours. 180 tablet 3     fluticasone (FLONASE) 50 MCG/ACT nasal spray Spray 1 spray into both nostrils daily as needed       latanoprost (XALATAN) 0.005 % ophthalmic solution Place 1 drop into both eyes At Bedtime       lisinopril (ZESTRIL) 20 MG tablet Take 1 tablet by mouth 2 times daily.       metFORMIN (GLUCOPHAGE XR) 500 MG 24 hr tablet Take 2-4 tablets by mouth daily (with dinner).       metoprolol succinate ER (TOPROL XL) 25 MG 24 hr tablet Take 1 tablet (25 mg) by mouth daily. 90 tablet 3     rosuvastatin (CRESTOR) 40 MG tablet Take 1 tablet by mouth daily at 2 pm.       No Known Allergies       Lab Results    Chemistry/lipid CBC Cardiac Enzymes/BNP/TSH/INR   Recent Labs   Lab Test 09/05/24  0845 02/21/24  0918   CHOL 147 164   HDL 54 59   LDL 77 89   TRIG 78 81     Recent Labs   Lab Test 09/05/24  0845 02/21/24  0918    144   POTASSIUM 5.1 5.1   CHLORIDE 103 107   CO2 25 26   ANIONGAP 10 11   * 126*   BUN 19.3 20.8   CR 1.20* 1.08   MEREDITH 9.0 8.9      CBC RESULTS: No results for input(s): \"WBC\", \"RBC\", \"HGB\", \"HCT\", \"MCV\", \"MCH\", \"MCHC\", \"RDW\", \"PLT\" in the last 68943 hours.   TSH   Date Value Ref Range Status   08/11/2023 2.18 0.30 - 4.20 uIU/mL Final            The longitudinal plan of care for the diagnosis(es)/condition(s) as documented were addressed during this visit. Due to the added complexity in care, I will continue to support Kael in the subsequent management and with ongoing continuity of care.                                               Thank you for allowing me to participate in the care of your patient.      Sincerely,     Lashell" AARON Anthony CNP     [facility]  cc:   AARON Ceballos CNP  [address] [facility] [facility], [address]  [place] [address]

## 2025-02-27 NOTE — PATIENT INSTRUCTIONS
Kael Dubois,    It was a pleasure to see you today at the [facility].     My recommendations after this visit include:    Continue metoprolol succinate 25 mg daily.  Take an extra 25 mg at onset of AF    Consideration for ablation to treat A fib    Follow up with Dr. Fields or Dr. Duncan to further discuss ablation    Lashell Anthony, CNP  [facility], Electrophysiology  [phone]  EP nurses [phone]     Information on Atrial Fibrillation Ablations    What is Catheter Ablation?  A Catheter Ablation is a procedure that treats certain types of abnormal heart rhythms (arrhythmia). There are several components to the procedure, but the final purpose is target and destroy (ablate) small areas of your heart muscle that are causing the arrhythmia.     Why is an Ablations Done?  A catheter ablation is an effective way to treat some types of abnormal heart rhythms. An ablation is a relatively low risk procedure that may permanently cure your abnormal heart rhythm.  The ablation process damages the heart cells which results in scarring of that area. The scar is electrically inactive and can produce a permanent cure for the abnormal rhythm.  Ablation procedures can help avoid the need for rhythm medications and give patients the ability to return to their normal activity and live an active life. In patients that do not have symptoms, ablations are not typically done as there may still be an increased risk of stroke.    Why is Catheter Ablation Done?  Sometimes, the heart s electrical system does not work properly.  This can cause abnormal heart rhythms, called arrhythmias.  During an arrhythmia, the heart may beat too fast, too slowly, or irregularly.  Your doctor has recommended catheter ablation to treat a rapid (fast) heart rhythm, or tachycardia.      How Catheter Ablation Is Done  Catheter ablation uses thin, flexible wires called electrode catheters to find and destroy  (ablate) problem cells.     Here is how the procedure is done:  The pulmonary veins will be treated first. There are currently two tools used to ablate around the pulmonary veins.  Radiofrequency catheter will heat the tissue.  Cryo-balloon catheter will freeze the tissue.   Testing will be done to confirm that effective treatment has been delivered.   Further testing may be performed to see if a fib is still present or if some other rhythm problem such as atrial flutter is present. If an ongoing rhythm problem is discovered then further ablation can be done to isolate and destroy those areas responsible for the arrhythmia.     Your  Experience during Catheter Ablation    In most cases, catheter ablations are done in an electrophysiology (EP) lab.  The procedural area: You will be transferred back to the procedure room once you have been appropriately prepped by the nursing staff and you are ready for your ablation.   Sedation: You to be put completely asleep for your ablation using general anesthesia.  Inserting the catheters: You will have 3-4 catheters inserted into the veins. Catheter locations can include the shoulder, neck, and groins. Catheters are guided to the heart with the help of ultrasound and x-ray monitors.  Finishing up: When the procedure is finished, the catheters are taken out of your body. A special closure device or suture may be used to help seal these puncture sites. You re then taken to your room to rest, and will be cared for by a nurse during your recovery.    Risks and Complications  The risks of catheter ablation are fairly low compared to the benefits you receive. Possible risks and complications include:  Common (up to 10%)  Bleeding or bruising  Shortness of breath  Heartburn  Uncommon (< 1%)  Blood clots  A slow heart rhythm (requiring a permanent pacemaker)  Perforation of the heart muscle, blood vessel, or lung (may require an emergency procedure)  Stroke  Damage to a heart valve    Heart attack, also known as acute myocardial infarction, or AMI   Death (extremely rare)    Before your Catheter Ablation  Before your catheter ablation, you will meet with the Electrophysiologist (specially trained heart doctor) who will do the procedure. The provider or a registered nurse will provide you with detailed instructions on how to prepare for this procedure, some of these instructions are listed below.  You will likely be told to stop or change your heart rhythm medications for a period of time before your ablation.   You may have testing done several days prior to your ablation or the morning of your ablation, such as an ECG, x-ray, blood tests, or echocardiogram.   You will not be allowed to eat or drink 8 hours before your ablation. You will be given further instructions by your physician or a registered nurse regarding the medication you will take the morning of your procedure.  You will need to arrange to have a  home from the hospital; you will not be permitted to drive after your procedure due to the sedation that you receive.   You are allowed to bring personal items and clothing to the hospital, but please refrain from bringing any valuables as the hospital is not responsible for any lost or stolen items.  You will need to bring a list of the names and dosages of the medication you are taking to the hospital.  It is important to mention to your doctor or registered nurse if you have any allergies, reactions to anesthesia, or have had history of bleeding problems.    Arriving at the Hospital the morning of your Catheter Ablation  Please check in at the time that was given to you by the , who scheduled your procedure. You do not need to arrive any earlier than the time that you were given.    When you arrive, you will be directed to the area where they will be performing your procedure. The doctor or registered nurse will meet with you prior to your ablation, this is a good time  to ask questions and address any concerns you may have. You will then be asked to sign the consent form for your ablation, if this has not already been done.    The nursing staff will begin to prepare you for your procedure:  A nurse will shave and cleanse the area where the ablation catheters will be placed. These areas are most commonly the left and right groin sites (the fold between your thigh and abdomen), and in some cases the chest, arm, and neck. This is done to reduce the risk of infection.    The nursing staff will start an intravenous (IV) line into a vein in your arm, which allows the staff to give you medication and sedatives to help you relax prior and during your ablation.  In some cases, the nursing staff will need to place a catheter that will drain urine from your bladder (Arnold Catheter), which is required due to the length and complexity of the ablation you are having.    After Catheter Ablation  Recovery immediately after your ablation in the hospital  After your catheter ablation procedure, you will be taken to a recovery room. After your ablation, you may be required to lay flat or be on bed rest. During this time, a nurse will monitor you, and you will be given medication to make you comfortable.     Going Home  When it is time to go home, your will need to have an adult family member or friend drive you. Most people can walk, climb stairs, and perform light activity soon after catheter ablation. You can most likely return to your full routine within a few days. However, you may be told to avoid running, heavy lifting, and other strenuous activities for a short time. Please make sure to follow any specific activity restrictions provided by the medical staff at the time of your discharge from the hospital.  Doctor's typically advise that you not drive until your post procedure assessment visit.   Avoid heavy physical activity and heavy lifting for several days after the procedure to allow your  body to heal.  Ask your doctor when you can expect to return to work.  Take your temperature and check your incision for signs of infection (redness, swelling, drainage, or warmth) every day for a week. It is normal to have a small bruise or lump where the catheter was inserted.  Take your medications exactly as directed. Do not skip doses or stop medication without consulting your physician prior.  Learn to take your own pulse and keep a record of your results.    Follow-Up  After your ablations you will have a follow up visit to see how you are doing, to assess your rhythm after your ablation, and to address any medication changes if necessary. In many cases, one ablation is enough to treat an arrhythmia. However, sometimes the problem returns or another is found. If this happens, you may need a second catheter ablation. Tell your healthcare provider if you have any new or returning symptoms.    Common Symptoms after Catheter Ablation  In the first few weeks after catheter ablation, you may feel mild chest fullness or aching. You may also feel as if your heart is skipping beats or your heartbeat may feel faster than normal. You may think that your heart rhythm problem is about to return. These sensations are normal and usually go away with time. Talk to your healthcare provider if you are concerned.      When to Call Your Doctor  Increased bleeding, bruising, or pain at the insertion site  Episodes of atrial fibrillation are common post procedure, call the clinic if episodes are lasting longer than 4-6 hours  Difficulty with your speech or walking, or any visual disturbance  Lightheaded, dizziness, or feeling faint  Shortness of breath or chest pain  Coldness, swelling, or numbness of the arm or leg near the insertion site  A bruise or lump at the insertion site that is larger than a walnut  A fever over 100 F

## 2025-02-27 NOTE — PROGRESS NOTES
HEART CARE ELECTROPHYSIOLOGY NOTE      [facility]  [phone]        Assessment/Recommendations   Assessment/Plan:  1.  Paroxysmal Atrial Fibrillation: 2 episodes of A Fib-RVR (9/2024, 2/2025) lasting ~24 hours.  We had a lengthy discussion of the physiology and natural progression of atrial fibrillation and treatment options with medications or pulmonary vein isolation ablation.  He appears to be a good candidate for ablation.  We further discussed the ablation procedure, risks, expected recovery.  He was given information to review at home.  He is potentially interested in pursuing catheter ablation.  Briefly discussed concomitant PVI +pLAAC.  Discussed avoidance of possible triggers.    -- Continue metoprolol succinate 25 mg daily.  Take an extra 25 mg at onset of AF  -- Monitor using Fairphone device.  He is to call for frequent or prolonged episodes of AF.  -- Consideration for catheter ablation    He was reassured that atrial fibrillation is not life-threatening, but carries an increased risk for stroke.  He has a [id number] score of 4 for age 65-74, HTN, vascular disease, DM.  He denies bleeding issues.    --Continue Eliquis 5 mg twice daily for stroke prophylaxis.    2.  Hypertension: Controlled on metoprolol XL and lisinopril..    Follow up with Dr. Fields or Dr. Duncan to further discuss PVI     History of Present Illness/Subjective    HPI: Kael Dubois is a 69 year old male who comes in today for EP follow-up of atrial fibrillation.  He has a history of paroxysmal atrial fibrillation, hypertension, hyperlipidemia, carotid stenosis s/p left CEA 9/1/2022, type 2 diabetes, possible sleep apnea.  COVID-19 infection in January 2024.    Arrhythmia history  Dx/date: Paroxysmal atrial fibrillation diagnosed 8/11/2023, though episodes of irregular heart rate on his blood pressure monitor for about 2 months prior.  He believes episodes were triggered by consuming a large quantity of  black licorice and taking pseudoephedrine.  Sx: Mild awareness of arrhythmia  [id number] score: 4 for age 65-74, HTN, vascular disease, DM  Oral anticoagulation: Eliquis 5 mg twice daily  Antiarrhythmic medications, AV danielle blocking agents: Metoprolol succinate 12.5 mg daily  Procedures  DCCV: N/A  Ablation: N/A    He states that generally he feels well.  He remains active and has been careful to hydrate more aggressively.  He monitors his heart rate with his Galaxy watch and Kardia device.  He had A fib 9/2024 and 2/26/2025, both sounds lasting for 1 day, rates 120s-130s.  Blood pressures consistently 110s/70s with resting heart rates in the 50s-60s.  He denies chest discomfort, palpitations, abdominal fullness/bloating or peripheral edema, shortness of breath, paroxysmal nocturnal dyspnea, orthopnea, lightheadedness, dizziness, pre-syncope, or syncope.  He reports sleeping well, no nocturnal desaturations noted on his watch.    Cardiographics (EKG and Kardia strips personally reviewed):  EKG done 8/31/2023 shows atrial fibrillation with rapid ventricular response at 131 bpm    Kardia ECG strips from 2/26/2025 & 2/27/2025 show A-fib with RVR up to 130s followed by sinus rhythm 80s.    MCOT worn 12/13/2023 to 12/26/2023 shows sinus rhythm with mild first-degree AV delay.  Average heart rate of 66 bpm and a range of 47 to 119 bpm.  No significant bradycardia or pauses.  No atrial fibrillation or other tachyarrhythmias.  No significant ventricular ectopy.    MARIELA:  Cardiac event monitoring from 7/27/2023 to 8/9/2023 (monitored duration 12d 17h 14m).  Baseline rhythm was sinus rhythm 86bpm.    Reported heart rate range 50 to 120bpm, average 76bpm.  1 symptom triggers correlated to sinus rhythm 84bpm.  No tachyarrhythmias.  No atrial fibrillation.  Intermittent first degree AV block.  There were no pauses noted.  Supraventricular and ventricular ectopic beat frequency are not reported on this monitoring modality.       ECHO done 8/28/2023 ([facility]):    NM treadmill stress test done 9/18/2023:    The exercise nuclear stress test is negative for inducible myocardial ischemia or infarction.    The exercise stress electrocardiogram is negative for inducible ischemic EKG changes.    The patient's exercise capacity is average for age and gender. The patient exercised for 8:59 minutes on the Amado protocol, achieving 10.3 METs and 91% the age-predicted maximum heart rate. The patient had no symptoms.    The left ventricular ejection fraction at stress is greater than 70%.    The patient is at a low risk of future cardiac ischemic events.    There is no prior study for comparison.    I have reviewed and updated the patient's Past Medical History, Social History, Family History and Medication List.  Outside records reviewed.     Physical Examination  Review of Systems   Vitals: /70 (BP Location: Left arm, Patient Position: Sitting, Cuff Size: Adult Large)   Pulse 80   Resp 14   Wt 107.5 kg (237 lb)   BMI 34.01 kg/m    BMI= Body mass index is 34.01 kg/m .  Wt Readings from Last 3 Encounters:   02/27/25 107.5 kg (237 lb)   02/14/24 107 kg (236 lb)   11/13/23 107.5 kg (237 lb)       General Appearance:   Alert, well-appearing and in no acute distress.   HEENT: Atraumatic, normocephalic.  No scleral icterus, normal conjunctivae, EOMs intact, PERRL.  Mucous membranes pink and moist.     Chest/Lungs:   Chest symmetric, spine straight.  Respirations unlabored.  Lungs are clear to auscultation.   Cardiovascular:   Regular rate and rhythm.  Normal first and second heart sounds with no murmurs, rubs, or gallops; radial pulses are intact, no edema.   Abdomen:  Soft, nondistended.   Extremities: No cyanosis or clubbing.   Musculoskeletal: Moves all extremities.     Skin: Warm, dry, intact.    Neurologic: Mood and affect are appropriate.  Alert and oriented to person, place, time, and situation.     ROS: 10 point ROS neg other than the  symptoms noted above in the HPI.        Medical History  Surgical History Family History Social History   Past Medical History:   Diagnosis Date    Benign essential hypertension 2023    Bilateral carotid artery stenosis 2023    Diverticulosis 2023    Elevated coronary artery calcium score 2023    Elevated PSA 2023    Hyperlipidemia, unspecified hyperlipidemia type 2023    Paroxysmal atrial fibrillation (H) 2023    Type 2 diabetes mellitus without complication, without long-term current use of insulin (H) 2023     Past Surgical History:   Procedure Laterality Date    CAROTID ENDARTERECTOMY Left 2022    INGUINAL HERNIA REPAIR      UMBILICAL HERNIA REPAIR       History reviewed. No pertinent family history.     Social History     Socioeconomic History    Marital status:      Spouse name: Not on file    Number of children: Not on file    Years of education: Not on file    Highest education level: Not on file   Occupational History    Not on file   Tobacco Use    Smoking status: Former     Current packs/day: 0.00     Types: Cigarettes     Quit date:      Years since quittin.1    Smokeless tobacco: Never   Substance and Sexual Activity    Alcohol use: Not on file    Drug use: Never    Sexual activity: Not on file   Other Topics Concern    Not on file   Social History Narrative    Not on file     Social Drivers of Health     Financial Resource Strain: Not on file   Food Insecurity: Not on file   Transportation Needs: Not on file   Physical Activity: Not on file   Stress: Not on file   Social Connections: Unknown (2022)    [facility] [facility]    Social Connections     Frequency of Communication with Friends and Family: Not on file   Interpersonal Safety: Not on file   Housing Stability: Not on file           Medications  Allergies   Current Outpatient Medications  "  Medication Sig Dispense Refill    ELIQUIS ANTICOAGULANT 5 MG tablet Take 1 tablet (5 mg) by mouth every 12 hours. 180 tablet 3    fluticasone (FLONASE) 50 MCG/ACT nasal spray Spray 1 spray into both nostrils daily as needed      latanoprost (XALATAN) 0.005 % ophthalmic solution Place 1 drop into both eyes At Bedtime      lisinopril (ZESTRIL) 20 MG tablet Take 1 tablet by mouth 2 times daily.      metFORMIN (GLUCOPHAGE XR) 500 MG 24 hr tablet Take 2-4 tablets by mouth daily (with dinner).      metoprolol succinate ER (TOPROL XL) 25 MG 24 hr tablet Take 1 tablet (25 mg) by mouth daily. 90 tablet 3    rosuvastatin (CRESTOR) 40 MG tablet Take 1 tablet by mouth daily at 2 pm.       No Known Allergies       Lab Results    Chemistry/lipid CBC Cardiac Enzymes/BNP/TSH/INR   Recent Labs   Lab Test 09/05/24  0845 02/21/24  0918   CHOL 147 164   HDL 54 59   LDL 77 89   TRIG 78 81     Recent Labs   Lab Test 09/05/24  0845 02/21/24  0918    144   POTASSIUM 5.1 5.1   CHLORIDE 103 107   CO2 25 26   ANIONGAP 10 11   * 126*   BUN 19.3 20.8   CR 1.20* 1.08   MEREDITH 9.0 8.9      CBC RESULTS: No results for input(s): \"WBC\", \"RBC\", \"HGB\", \"HCT\", \"MCV\", \"MCH\", \"MCHC\", \"RDW\", \"PLT\" in the last 84990 hours.   TSH   Date Value Ref Range Status   08/11/2023 2.18 0.30 - 4.20 uIU/mL Final            The longitudinal plan of care for the diagnosis(es)/condition(s) as documented were addressed during this visit. Due to the added complexity in care, I will continue to support Kael in the subsequent management and with ongoing continuity of care.                                           "

## 2025-03-19 ENCOUNTER — OFFICE VISIT (OUTPATIENT)
Dept: CARDIOLOGY | Facility: CLINIC | Age: 70
End: 2025-03-19
Payer: COMMERCIAL

## 2025-03-19 ENCOUNTER — PREP FOR PROCEDURE (OUTPATIENT)
Dept: CARDIOLOGY | Facility: CLINIC | Age: 70
End: 2025-03-19

## 2025-03-19 ENCOUNTER — DOCUMENTATION ONLY (OUTPATIENT)
Dept: CARDIOLOGY | Facility: CLINIC | Age: 70
End: 2025-03-19

## 2025-03-19 VITALS
BODY MASS INDEX: 34.13 KG/M2 | RESPIRATION RATE: 12 BRPM | HEIGHT: 70 IN | SYSTOLIC BLOOD PRESSURE: 116 MMHG | WEIGHT: 238.4 LBS | HEART RATE: 68 BPM | DIASTOLIC BLOOD PRESSURE: 64 MMHG

## 2025-03-19 DIAGNOSIS — I48.0 PAROXYSMAL ATRIAL FIBRILLATION (H): Primary | ICD-10-CM

## 2025-03-19 PROCEDURE — 3078F DIAST BP <80 MM HG: CPT | Performed by: INTERNAL MEDICINE

## 2025-03-19 PROCEDURE — 99205 OFFICE O/P NEW HI 60 MIN: CPT | Performed by: INTERNAL MEDICINE

## 2025-03-19 PROCEDURE — 3074F SYST BP LT 130 MM HG: CPT | Performed by: INTERNAL MEDICINE

## 2025-03-19 RX ORDER — SODIUM CHLORIDE 9 MG/ML
100 INJECTION, SOLUTION INTRAVENOUS CONTINUOUS
OUTPATIENT
Start: 2025-03-19

## 2025-03-19 RX ORDER — DEXMEDETOMIDINE HYDROCHLORIDE 4 UG/ML
.1-1.5 INJECTION, SOLUTION INTRAVENOUS CONTINUOUS
OUTPATIENT
Start: 2025-03-19

## 2025-03-19 RX ORDER — FENTANYL CITRATE 50 UG/ML
25 INJECTION, SOLUTION INTRAMUSCULAR; INTRAVENOUS
OUTPATIENT
Start: 2025-03-19

## 2025-03-19 RX ORDER — LIDOCAINE 40 MG/G
CREAM TOPICAL
OUTPATIENT
Start: 2025-03-19

## 2025-03-19 NOTE — H&P (VIEW-ONLY)
[facility]  Cardiac Electrophysiology  [address]  [place] [address]   Office: [phone]  Fax: [phone]     Patient: Kael Dubois   : 1955       CHIEF COMPLAINT/REASON FOR VISIT  Paroxysmal atrial fibrillation      Assessment/Recommendations     [id number]/Paroxysmal atrial fibrillation - mildly symptomatic, associated with increased risk of stroke, heart failure and mortality  [id number]  We reviewed atrial fibrillation, managing stroke risk via anticoagulation or LAAO, managing heart failure risk, rate control, antiarrhythmic drug therapy, and catheter ablation.  We discussed atrial fibrillation ablation procedures, anticipated success rates, the potential need for re-do ablation vs addition of anti-arrhythmic drugs, procedural risks (including groin bleeding, vascular injury, tamponade, phrenic or esophageal injury, stroke, pulmonary vein stenosis) and recovery expectations.  We reviewed pLAAO, success rates, risks (1-2%, risks including above, DRT, device embolization) and recovery expectation.  He would prefer catheter ablation, though will further consider Watchman and timing (concomitant vs staged)  - our office will provide you with some further information regarding Watchman implantation - this can be done at time of your ablation, or can be done at a later time  - we will hold of on schedule ablation until he has had a chance to review Watchman further  - if ablation elected upon, PVI, general anesthesia, continue apixaban  - continue metoprolol XL 25mg daily  - continue apixaban 5mg twice daily  - continue using Kardia  - we discussed the ongoing importance of lifestyle modification (maintaining a healthy weight, aerobic activity, sleep apnea diagnosis and management, alcohol avoidance) as part of a long term strategy for atrial fibrillation management    Follow up: as above           History of Present Illness   Kael Dubois is a 69 year  Reason for Admission: [facility]    Cognitive/Mentation: A/Ox self  Neuros/CMS: Intact ex non verbal, does not follow most commands, left side hemiplegia,left droop,  VS: stable.   Tele: NSR.  GI: , flatus, last BM 1/13. Incontinent.  : external cath. Incontinent..  Pain: right hip/ tylenol given.   Skin: dry, some bruising  Activity: Assist x 2 with lift.  Diet:  NPO/ Tube feed 50/hr.     Therapies recs: TCU  Discharge: when bed available        "old male with paroxysmal atrial fibrillation, carotid stenosis with left CEA 9/2022, HTN, DM referred by Lashell Anthony CNP for consultation regarding atrial fibrillation.    Mr. Dubois's atrial fibrillation history is as summarized below:  Symptoms: mild palpitation  Diagnosis date: 8/11/2023  Admissions/ER visits: none  Prior medical therapies: no I, III AADs  Prior DCCVs: none  Prior ablations: none  Percutaneous left atrial appendage occlusion: none    He notes episodes of AF, approximately once every 6mo eg 9/2024 and 2/26/2025, each lasting for approx 24hrs, ventricular rates 120s-130s. He denies chest pain, syncope.       Physical Examination  Review of Systems   VITALS: /64 (BP Location: Right arm, Patient Position: Sitting, Cuff Size: Adult Large)   Pulse 68   Resp 12   Ht 1.778 m (5' 10\")   Wt 108.1 kg (238 lb 6.4 oz)   BMI 34.21 kg/m    Wt Readings from Last 3 Encounters:   02/27/25 107.5 kg (237 lb)   02/14/24 107 kg (236 lb)   11/13/23 107.5 kg (237 lb)     CONSTITUTIONAL: well nourished, comfortable, no distress  EYES:  Conjunctivae pink, sclerae clear.    E/N/T:  Oral mucosa pink  RESPIRATORY:  Respiratory effort is normal  CARDIOVASCULAR:  normal S1 and S2  GASTROINTESTINAL:  Abdomen without masses or tenderness  EXTREMITIES:  No clubbing or cyanosis.    MUSCULOSKELETAL:  Overall grossly normal muscle strength  SKIN:  Overall, skin warm and dry, no lesions.  NEURO/PSYCH:  Oriented x 3 with normal affect.   Constitutional:  No weight loss or loss of appetite    Eyes:  No difficulty with vision, no double vision, no dry eyes  ENT:  No sore throat, difficulty swallowing; changes in hearing or tinnitus  Cardiovascular: As detailed above  Respiratory:  No cough  Musculoskeletal  No joint pain, muscle aches  Neurologic:  No syncope, lightheadedness, fainting spells   Hematologic: No easy bruising, excessive bleeding tendency   Gastrointestinal:  No jaundice, abdominal pain or abdominal " bloating  Genitourinary: No changes in urinary habits, no trouble urinating    Psychiatric: No anxiety or depression      Medical History  Surgical History   Past Medical History:   Diagnosis Date    Benign essential hypertension 2023    Bilateral carotid artery stenosis 2023    Diverticulosis 2023    Elevated coronary artery calcium score 2023    Elevated PSA 2023    Hyperlipidemia, unspecified hyperlipidemia type 2023    Paroxysmal atrial fibrillation (H) 2023    Type 2 diabetes mellitus without complication, without long-term current use of insulin (H) 2023    Past Surgical History:   Procedure Laterality Date    CAROTID ENDARTERECTOMY Left 2022    INGUINAL HERNIA REPAIR      UMBILICAL HERNIA REPAIR           Family History Social History   No family history on file.     Social History     Tobacco Use    Smoking status: Former     Current packs/day: 0.00     Types: Cigarettes     Quit date:      Years since quittin.2    Smokeless tobacco: Never   Substance Use Topics    Drug use: Never         Medications  Allergies     Current Outpatient Medications:     ELIQUIS ANTICOAGULANT 5 MG tablet, Take 1 tablet (5 mg) by mouth every 12 hours., Disp: 180 tablet, Rfl: 3    fluticasone (FLONASE) 50 MCG/ACT nasal spray, Spray 1 spray into both nostrils daily as needed, Disp: , Rfl:     latanoprost (XALATAN) 0.005 % ophthalmic solution, Place 1 drop into both eyes At Bedtime, Disp: , Rfl:     lisinopril (ZESTRIL) 20 MG tablet, Take 1 tablet by mouth 2 times daily., Disp: , Rfl:     metFORMIN (GLUCOPHAGE XR) 500 MG 24 hr tablet, Take 2-4 tablets by mouth daily (with dinner)., Disp: , Rfl:     metoprolol succinate ER (TOPROL XL) 25 MG 24 hr tablet, Take 1 tablet (25 mg) by mouth daily., Disp: 90 tablet, Rfl: 3    rosuvastatin (CRESTOR) 40 MG tablet, Take 1 tablet by mouth daily at 2 pm., Disp: , Rfl:    No Known Allergies       Lab Results    Chemistry CBC Cardiac  "Enzymes/BNP/TSH/INR   Recent Labs   Lab Test 03/13/25  0840      POTASSIUM 4.4   CHLORIDE 101   CO2 23   *   BUN 16.3   CR 1.07   GFRESTIMATED 74   MEREDITH 9.3     Recent Labs   Lab Test 03/13/25  0840 09/05/24  0845 02/21/24  0918   CR 1.07 1.20* 1.08          No results for input(s): \"WBC\", \"HGB\", \"HCT\", \"MCV\", \"PLT\" in the last 55799 hours.  No results for input(s): \"HGB\" in the last 74635 hours. No results for input(s): \"TROPONINI\" in the last 36836 hours.  No results for input(s): \"BNP\", \"NTBNPI\", \"NTBNP\" in the last 05826 hours.  Recent Labs   Lab Test 08/11/23  1314   TSH 2.18     No results for input(s): \"INR\" in the last 41309 hours.      Data Review    ECGs (tracings independently reviewed)  8/11/2023 - AF, 131bpm, QTC 432ms    12/13/2023 monitoring (independently reviewed)  No AF    8/23/2023 TTE      9/18/2023 MPI    The exercise nuclear stress test is negative for inducible myocardial ischemia or infarction.    The exercise stress electrocardiogram is negative for inducible ischemic EKG changes.    The patient's exercise capacity is average for age and gender. The patient exercised for 8:59 minutes on the Amado protocol, achieving 10.3 METs and 91% the age-predicted maximum heart rate. The patient had no symptoms.    The left ventricular ejection fraction at stress is greater than 70%.    The patient is at a low risk of future cardiac ischemic events.    There is no prior study for comparison.       Cc: Lashell Anthony CNP, Amado Baez MD Amila Dilusha William, MD  3/19/2025  7:31 AM      "

## 2025-03-19 NOTE — PROGRESS NOTES
Pt called back and has decided he does not want to proceed with PVI + LAAO. He would only like to have PVI done.     Elham Zavala RN

## 2025-03-19 NOTE — PATIENT INSTRUCTIONS
[facility]  Cardiac Electrophysiology  [address]  [place] [address]   Office: [phone]  Fax: [phone]     Thank you for seeing us in clinic today - it is a pleasure to be a part of your care team.  Below is a summary of our plan from today's visit.       You have atrial fibrillation.  We reviewed atrial fibrillation, treatment options (ablation vs antiarrhythmic drug therapy), and long term stroke risk prevention (blood thinner vs Watchman device).    We will plan for the following:  - our office will provide you with some further information regarding Watchman implantation - Watchman implantation can be done at time of your ablation, or can be done at a later time  - we will hold of on schedule ablation until you have had a chance to review Watchman  - continue metoprolol XL 25mg daily  - continue apixaban 5mg twice daily  - continue using Kardia  - we discussed the ongoing importance of lifestyle modification (maintaining a healthy weight, aerobic activity, sleep apnea diagnosis and management, alcohol avoidance) as part of a long term strategy for atrial fibrillation management     Please do not hesitate to be in touch with our office at [phone] with any questions that may arise.       Thank you for trusting us with your care,    Montserrat Fields MD  Clinical Cardiac Electrophysiology  [facility]  [address]  [place] [address]   Office: [phone]  Fax: [phone]        ATRIAL FIBRILLATION: Patient Information    What is atrial fibrillation?  Atrial fibrillation (AF, A-fib) is a common heart rhythm problem (arrhythmia) occurring within the upper chambers of the heart (the atria).  In normal rhythm, the upper and lower chambers of the heart are electrically driven to contract in a coordinated sequence.  In atrial fibrillation, the atria lose their ability to contract because of rapid and chaotic  electrical activity.  The lower chambers of the heart (the ventricles) continue to pump blood throughout the body, though with irregular and often faster rate due to the chaotic activity within the atria.        How do I know if I have atrial fibrillation?   Some people may feel their heart beating faster, harder, or irregularly while in atrial fibrillation.  Others may be lightheaded, fatigued, feel weak or tired or become more short of breath especially with activities.  Some patients have no symptoms at all.  Atrial fibrillation may be found due to an irregular pulse or on an electrocardiogram (ECG). Atrial fibrillation can start and stop on its own, and episodes can last from seconds to several months.      How common is atrial fibrillation?   An estimated 3-6 million people in the [place] have atrial fibrillation.  Atrial fibrillation is a common heart rhythm problem for older persons, affecting as estimated 12-15% of people over the age of 65 years of age.    What causes atrial fibrillation?   Age is the most important risk factor for atrial fibrillation.  Atrial fibrillation is more common in people with other heart disease, high blood pressure, diabetes, obesity, sleep apnea and in people who regularly consume alcohol.  Surgery, lung disease, or thyroid problems can lead to atrial fibrillation.  Atrial fibrillation has multiple possible causes, and in most cases a single cause cannot be found.  Atrial fibrillation is a progressive condition, usually starting with at an early stage with short and infrequent episodes.  In later stages of disease, more frequent and longer lasting episodes of atrial fibrillation occur, ultimately culminating in episodes which do not spontaneously terminate.  Generally, more enlargement and scarring within the upper chambers of the heart is observed as atrial fibrillation progresses from early to late-stage disease.    How is atrial fibrillation diagnosed and evaluated?     Because of its start-stop nature, atrial fibrillation can be challenging to diagnose.  Atrial fibrillation is most commonly diagnosed via cardiac rhythm recordings - either an ECG or wearable cardiac rhythm monitor.  For patients with pacemakers, defibrillators or implantable loop recorders, atrial fibrillation may be recorded via these devices.  Recently, commercially available devices (eg. Apple Watch, [employer] device, certain [employer] devices, others) can allow patients to take 30 second cardiac rhythm recordings which may document atrial fibrillation.  Once atrial fibrillation is diagnosed, additional tests include blood tests and an echocardiogram.  The echocardiogram uses ultrasound to look at your heart to assess your cardiac function and evaluate for other heart disease.  Additional evaluation may include CT or MRI studies.    Is atrial fibrillation dangerous?   Atrial fibrillation is not usually a life-threatening arrhythmia.  The most serious consequences of atrial fibrillation including stroke and worsening of overall cardiac function.  While in atrial fibrillation, the upper cardiac chambers do not contract normally, resulting in slower blood flow and increased risk of clot formation.  If this blood clot becomes detached from the heart a stroke can occur.  Unfortunately, stroke can be the first sign of atrial fibrillation for some people.  With a stroke, you may notice abnormal sensation, weakness on one side of the body or face, changes in your vision or speech.  If you have any of these signs, you should contact EMS and be evaluated in an emergency room as soon as possible.      How is atrial fibrillation treated?     Several treatment options exist for suppressing atrial fibrillation - however, it is not an easily curable arrhythmia.  The first goal in managing atrial fibrillation is to minimize stroke risk.  The second goal is to improve symptoms associated with atrial fibrillation.  Finally, in  patients with reduced cardiac function, maintaining normal rhythm can help improve cardiac function.      Blood thinners are used to reduce the risk of stroke in patients with high estimated stroke risk related to atrial fibrillation.  For patients at higher risk of bleeding related to blood thinner use, implantable devices may be an option to reduce stroke risk without the need for long term blood thinner use.      Atrial fibrillation can be managed via two strategies: rate control and rhythm control.  In a rate control strategy, continued atrial fibrillation is accepted and medications (eg. beta-blockers or calcium channel blockers) are used to control the lower chamber rate.  In a rhythm control strategy, anti-arrhythmic medications or catheter ablation are used to maintain normal cardiac rhythm and slow disease progression by suppressing atrial fibrillation.  A procedure called a cardioversion, in which an electric shock is delivered through patches placed on the chest wall while under deep sedation, can be performed to temporarily restore normal cardiac rhythm, though does not address the chance of atrial fibrillation recurrence.  Treatments are more effective for earlier rather than later stage atrial fibrillation.  Lifestyle modifications (maintaining a healthy weight, aerobic exercise, diagnosing and treating sleep apnea, and minimizing alcohol intake) are important elements of atrial fibrillation rhythm control.     What is catheter ablation for atrial fibrillation?  Cardiac catheter ablation is a commonly performed, minimally invasive procedure performed by a cardiac electrophysiologist to treat many different cardiac rhythm abnormalities.  During catheter ablation, long, thin, flexible tubes are advanced into the heart via small sheaths inserted into the femoral veins and thermal energy (either heating or cooling) is applied within the heart to disrupt abnormal electrical activity.  Atrial fibrillation  ablation is performed under general anesthesia, with procedures generally taking approximately 2-3 hours.  Patients are typically observed for 3-5 hours after the ablation, and in most cases can be discharged home the same day.  Atrial fibrillation ablation is associated with better outcomes (mortality, cardiovascular hospitalizations, atrial arrhythmia recurrences) compared to antiarrhythmic drug therapy.  However, atrial fibrillation recurrences are not uncommon, and repeat catheter ablation may be offered.  Your electrophysiology team can review atrial fibrillation ablation, anticipated success rates, risks, and recovery expectations with you.    What are anti-arrhythmic medications?  Anti-arrhythmic medications are specialized drugs which alter cardiac electrical functioning to suppress arrhythmias.  There are several anti-arrhythmic medications available, each with its own success rate and side effects.  Some anti-arrhythmic medications are less effective though safer to use, others are more effective though have serious potential toxicities.  Atrial fibrillation recurrences are common and may require dose adjustment or change in antiarrhythmic therapy.  Your electrophysiology team will carefully consider which medication would be the best and safest for your particular case.      Can I live a normal life?    The goal of atrial fibrillation management is for patients to live normal lives without being limited by symptoms related to atrial fibrillation.    Are any additional educational resources available?  There are a number of excellent atrial fibrillation education resources available to you online.  A few options you may wish to review include:  hrsonline.org/guide-atrial-fibrillation  afibmatters.org  getsmartaboutafib.com  stopaf.com    What comes next?    Consider your management options and let us know how we can help in your decision process.  Please take medications as they have been prescribed.  You  should also get any tests that may have been ordered for you.      When to Call Your Doctor or seek emergency care:  Call your doctor or seek emergency care if you have any significant changes with the following:  Weakness  Dizziness  Fainting  Fatigue  Shortness of breath  Chest pain with increased activity  If you are concerned that your heart rate is too fast or too slow  Bleeding that does not stop in 10 minutes  Coughing or throwing up blood  Bloody diarrhea or bleeding hemorrhoids  Dark-colored urine or black stool  Allergic reactions:  Rash  Itching  Swelling  Trouble breathing or swallowing      Please call the [facility] at [phone] if you have concerns about your symptoms, your medicines, or your follow-up appointments.

## 2025-03-19 NOTE — PROGRESS NOTES
PVI  Order Case Req Y  Order Set Y Imaging Order None     AC Eliquis-CONTINUE   AAD Metoprolol-Continue   PPI/H2 Blocker PPI not needed PFA   Diuretics None   DM/GLP-1 DM Meds-  Metformin  GLP-1- None   ED Meds  None     Montserrat Fields MD  P [facility] Left Atrial Appendage Closure Pool - Lhe; P [facility] Ep Support Pool - Lhe  Hi,    Can we provide Mr. Dubois with some additional information re: Watchman.      EP RN team - note of message to coordinate ablation just sent - he had subsequently wanted to query combined pLAAO.  Can proceed at his preference with either PVI alone or concomitant PVI+pLAAO.    Thank you,  Vivienne Scruggs KRISTYN Dubois, 1955, [id number]  Home:[phone] (home) Cell:[phone] (mobile)  Emergency Contact: Silvino Akhtar   PCP: Amado Baez, [phone]    Important patient information for CSC/Cath Lab staff : None    [facility] EP Cath Lab Procedure Order   Ablation Type:  Atrial Fibrillation (Paroxsymal)  Case Request: Concomitant with PFA, if pt declines LAAO then ok to proceed with PFA PVI  Ordering Provider: Dr Donnie Potter Ordered and Prepped: 3/19/2025 Cheyanne Valdez, RN    Scheduling Information:  Anticipated Case Duration:  Standard ( Case per day SA 2:1, DW 5:1, KA 3:1)   Scheduling Timeframe:  Next Available  Scheduling Restrictions: None  Scheduling Contact: Please contact pt to schedule, if you are unable to schedule date within the next 24 hours please contact pt to update on scheduling process  EP RN Follow Up Apt: Schedule EP RN PC visit 3-4 days s/p PVI  MD Preference: Scheduling with ordering provider  Current Device/Device Co Needed for Procedure: None NoneNone  Pre-Procedural Testing needed: None  Mapping System Required:  Carto (Dr Fields and Dr Duncan)  ICE Needed:  Yes  Anesthesia:General Anesthesia    [facility] EP Cath Lab Prep   H&P:  Compled by cardiology on 3/19/25 if scheduled within 30 days, pt will need RN education and Labs apt within 3 days of procedure. If  pts PVI scheduled outside of 30 days, pt to schedule H&P with EP NGOZI, RN Teach, and Labs within 30 days of PVI  Pre-op Labs: CBC, BMP, Beta HcG if appropriate, and INR if on Warfarin will be ordered AM of procedure, if not completed at pre-op H&P within 7 days of procedure.  T&S Pre-Procedure Review: Does not need for PVI procedures  Medical Records Pertinent for Procedure:  None  Iodinated Contrast Dye Allergies (Does not include Shellfish, Egg, and/or Iodine Allergy): NA  GLP-1 Protocol: If on Dulaglutide (Trulicity) (weekly)- Injection hold 7 days prior to procedure  , Exenatide extended release (Bydureon bcise) (weekly)- Injection hold 7 days prior to procedure, Exenatide (Byetta) (twice daily)- Oral Tablet hold day prior and morning of procedure and for Injection hold 7 days prior to procedure, Semaglutide (Ozempic) (weekly)- Injection and Oral hold 7 days prior to procedure, Liraglutide (Victoza, Saxenda) (daily)- Injection hold day prior and morning of procedure  ED Meds Protocol:  If taking Sildenafil (Viagra), Tadalafill (Cialis, Adcirca), or Vardnafil (Levitra, Staxyn)- Hold 3 days prior to procedure  Follow Up S/P: EP RN 3-4 PC Visit and EP NGOZI 6wk (To be scheduled at time of case scheduling)    No Known Allergies    Current Outpatient Medications:     ELIQUIS ANTICOAGULANT 5 MG tablet, Take 1 tablet (5 mg) by mouth every 12 hours., Disp: 180 tablet, Rfl: 3    fluticasone (FLONASE) 50 MCG/ACT nasal spray, Spray 1 spray into both nostrils daily as needed, Disp: , Rfl:     latanoprost (XALATAN) 0.005 % ophthalmic solution, Place 1 drop into both eyes At Bedtime, Disp: , Rfl:     lisinopril (ZESTRIL) 20 MG tablet, Take 1 tablet by mouth 2 times daily., Disp: , Rfl:     metFORMIN (GLUCOPHAGE XR) 500 MG 24 hr tablet, Take 3 tablets by mouth daily (with dinner)., Disp: , Rfl:     metoprolol succinate ER (TOPROL XL) 25 MG 24 hr tablet, Take 1 tablet (25 mg) by mouth daily., Disp: 90 tablet, Rfl: 3     rosuvastatin (CRESTOR) 40 MG tablet, Take 1 tablet by mouth daily at 2 pm., Disp: , Rfl:     Documentation Date:3/19/2025 8:30 AM  Cheyanne Valdez RN

## 2025-03-19 NOTE — LETTER
3/19/2025    Amado Baez MD  [address]  [facility] [address]    RE: Kael Dubois       Dear Colleague,     I had the pleasure of seeing Kael Dubois in the [facility].     [facility]  Cardiac Electrophysiology  [address]  [place] [address]   Office: [phone]  Fax: [phone]     Patient: Kael Dubois   : 1955       CHIEF COMPLAINT/REASON FOR VISIT  Paroxysmal atrial fibrillation      Assessment/Recommendations     Iqspi1S/Paroxysmal atrial fibrillation - mildly symptomatic, associated with increased risk of stroke, heart failure and mortality  [id number] 4  We reviewed atrial fibrillation, managing stroke risk via anticoagulation or LAAO, managing heart failure risk, rate control, antiarrhythmic drug therapy, and catheter ablation.  We discussed atrial fibrillation ablation procedures, anticipated success rates, the potential need for re-do ablation vs addition of anti-arrhythmic drugs, procedural risks (including groin bleeding, vascular injury, tamponade, phrenic or esophageal injury, stroke, pulmonary vein stenosis) and recovery expectations.  He would prefer catheter ablation  - PVI, general anesthesia, continue apixaban  - continue metoprolol XL 25mg daily  - continue apixaban 5mg twice daily  - continue using Kardia  - we discussed the ongoing importance of lifestyle modification (maintaining a healthy weight, aerobic activity, sleep apnea diagnosis and management, alcohol avoidance) as part of a long term strategy for atrial fibrillation management    Follow up: as above           History of Present Illness   Kael Dubois is a 69 year old male with paroxysmal atrial fibrillation, carotid stenosis with left CEA 2022, HTN, DM referred by Lashell Anthony CNP for consultation regarding atrial fibrillation.    Mr. Dubois's atrial fibrillation history is as summarized below:  Symptoms: mild palpitation  Diagnosis date:  "8/11/2023  Admissions/ER visits: none  Prior medical therapies: no I, III AADs  Prior DCCVs: none  Prior ablations: none  Percutaneous left atrial appendage occlusion: none    He notes episodes of AF, approximately once every 6mo eg 9/2024 and 2/26/2025, each lasting for approx 24hrs, ventricular rates 120s-130s. He denies chest pain, syncope.       Physical Examination  Review of Systems   VITALS: /64 (BP Location: Right arm, Patient Position: Sitting, Cuff Size: Adult Large)   Pulse 68   Resp 12   Ht 1.778 m (5' 10\")   Wt 108.1 kg (238 lb 6.4 oz)   BMI 34.21 kg/m    Wt Readings from Last 3 Encounters:   02/27/25 107.5 kg (237 lb)   02/14/24 107 kg (236 lb)   11/13/23 107.5 kg (237 lb)     CONSTITUTIONAL: well nourished, comfortable, no distress  EYES:  Conjunctivae pink, sclerae clear.    E/N/T:  Oral mucosa pink  RESPIRATORY:  Respiratory effort is normal  CARDIOVASCULAR:  normal S1 and S2  GASTROINTESTINAL:  Abdomen without masses or tenderness  EXTREMITIES:  No clubbing or cyanosis.    MUSCULOSKELETAL:  Overall grossly normal muscle strength  SKIN:  Overall, skin warm and dry, no lesions.  NEURO/PSYCH:  Oriented x 3 with normal affect.   Constitutional:  No weight loss or loss of appetite    Eyes:  No difficulty with vision, no double vision, no dry eyes  ENT:  No sore throat, difficulty swallowing; changes in hearing or tinnitus  Cardiovascular: As detailed above  Respiratory:  No cough  Musculoskeletal  No joint pain, muscle aches  Neurologic:  No syncope, lightheadedness, fainting spells   Hematologic: No easy bruising, excessive bleeding tendency   Gastrointestinal:  No jaundice, abdominal pain or abdominal bloating  Genitourinary: No changes in urinary habits, no trouble urinating    Psychiatric: No anxiety or depression      Medical History  Surgical History   Past Medical History:   Diagnosis Date     Benign essential hypertension 09/14/2023     Bilateral carotid artery stenosis 11/13/2023     " Diverticulosis 2023     Elevated coronary artery calcium score 2023     Elevated PSA 2023     Hyperlipidemia, unspecified hyperlipidemia type 2023     Paroxysmal atrial fibrillation (H) 2023     Type 2 diabetes mellitus without complication, without long-term current use of insulin (H) 2023    Past Surgical History:   Procedure Laterality Date     CAROTID ENDARTERECTOMY Left 2022     INGUINAL HERNIA REPAIR       UMBILICAL HERNIA REPAIR           Family History Social History   No family history on file.     Social History     Tobacco Use     Smoking status: Former     Current packs/day: 0.00     Types: Cigarettes     Quit date:      Years since quittin.2     Smokeless tobacco: Never   Substance Use Topics     Drug use: Never         Medications  Allergies     Current Outpatient Medications:      ELIQUIS ANTICOAGULANT 5 MG tablet, Take 1 tablet (5 mg) by mouth every 12 hours., Disp: 180 tablet, Rfl: 3     fluticasone (FLONASE) 50 MCG/ACT nasal spray, Spray 1 spray into both nostrils daily as needed, Disp: , Rfl:      latanoprost (XALATAN) 0.005 % ophthalmic solution, Place 1 drop into both eyes At Bedtime, Disp: , Rfl:      lisinopril (ZESTRIL) 20 MG tablet, Take 1 tablet by mouth 2 times daily., Disp: , Rfl:      metFORMIN (GLUCOPHAGE XR) 500 MG 24 hr tablet, Take 2-4 tablets by mouth daily (with dinner)., Disp: , Rfl:      metoprolol succinate ER (TOPROL XL) 25 MG 24 hr tablet, Take 1 tablet (25 mg) by mouth daily., Disp: 90 tablet, Rfl: 3     rosuvastatin (CRESTOR) 40 MG tablet, Take 1 tablet by mouth daily at 2 pm., Disp: , Rfl:    No Known Allergies       Lab Results    Chemistry CBC Cardiac Enzymes/BNP/TSH/INR   Recent Labs   Lab Test 25  0840      POTASSIUM 4.4   CHLORIDE 101   CO2 23   *   BUN 16.3   CR 1.07   GFRESTIMATED 74   MEREDITH 9.3     Recent Labs   Lab Test 25  0840 24  0845 24  0918   CR 1.07 1.20* 1.08          No  "results for input(s): \"WBC\", \"HGB\", \"HCT\", \"MCV\", \"PLT\" in the last 02005 hours.  No results for input(s): \"HGB\" in the last 52781 hours. No results for input(s): \"TROPONINI\" in the last 38228 hours.  No results for input(s): \"BNP\", \"NTBNPI\", \"NTBNP\" in the last 37781 hours.  Recent Labs   Lab Test 08/11/23  1314   TSH 2.18     No results for input(s): \"INR\" in the last 69822 hours.      Data Review    ECGs (tracings independently reviewed)  8/11/2023 - AF, 131bpm, QTC 432ms    12/13/2023 monitoring (independently reviewed)  No AF    8/23/2023 TTE      9/18/2023 MPI     The exercise nuclear stress test is negative for inducible myocardial ischemia or infarction.     The exercise stress electrocardiogram is negative for inducible ischemic EKG changes.     The patient's exercise capacity is average for age and gender. The patient exercised for 8:59 minutes on the Amado protocol, achieving 10.3 METs and 91% the age-predicted maximum heart rate. The patient had no symptoms.     The left ventricular ejection fraction at stress is greater than 70%.     The patient is at a low risk of future cardiac ischemic events.     There is no prior study for comparison.       Cc: Lashell Anthnoy CNP, Amado Baez MD Amila Dilusha William, MD  3/19/2025  7:31 AM        Thank you for allowing me to participate in the care of your patient.      Sincerely,     Montserrat Fields MD     [facility]  cc:   AARON Ceballos CNP  [address], [address]  [place] [address]      "

## 2025-03-19 NOTE — PROGRESS NOTES
[facility]  Cardiac Electrophysiology  [address]  [place] [address]   Office: [phone]  Fax: [phone]     Patient: Kael Dubois   : 1955       CHIEF COMPLAINT/REASON FOR VISIT  Paroxysmal atrial fibrillation      Assessment/Recommendations     Qjwet5L/Paroxysmal atrial fibrillation - mildly symptomatic, associated with increased risk of stroke, heart failure and mortality  [id number] 4  We reviewed atrial fibrillation, managing stroke risk via anticoagulation or LAAO, managing heart failure risk, rate control, antiarrhythmic drug therapy, and catheter ablation.  We discussed atrial fibrillation ablation procedures, anticipated success rates, the potential need for re-do ablation vs addition of anti-arrhythmic drugs, procedural risks (including groin bleeding, vascular injury, tamponade, phrenic or esophageal injury, stroke, pulmonary vein stenosis) and recovery expectations.  We reviewed pLAAO, success rates, risks (1-2%, risks including above, DRT, device embolization) and recovery expectation.  He would prefer catheter ablation, though will further consider Watchman and timing (concomitant vs staged)  - our office will provide you with some further information regarding Watchman implantation - this can be done at time of your ablation, or can be done at a later time  - we will hold of on schedule ablation until he has had a chance to review Watchman further  - if ablation elected upon, PVI, general anesthesia, continue apixaban  - continue metoprolol XL 25mg daily  - continue apixaban 5mg twice daily  - continue using Kardia  - we discussed the ongoing importance of lifestyle modification (maintaining a healthy weight, aerobic activity, sleep apnea diagnosis and management, alcohol avoidance) as part of a long term strategy for atrial fibrillation management    Follow up: as above           History of Present Illness   Kael Dubois is a 69 year  "old male with paroxysmal atrial fibrillation, carotid stenosis with left CEA 9/2022, HTN, DM referred by Lashell Anthony CNP for consultation regarding atrial fibrillation.    Mr. uDbois's atrial fibrillation history is as summarized below:  Symptoms: mild palpitation  Diagnosis date: 8/11/2023  Admissions/ER visits: none  Prior medical therapies: no I, III AADs  Prior DCCVs: none  Prior ablations: none  Percutaneous left atrial appendage occlusion: none    He notes episodes of AF, approximately once every 6mo eg 9/2024 and 2/26/2025, each lasting for approx 24hrs, ventricular rates 120s-130s. He denies chest pain, syncope.       Physical Examination  Review of Systems   VITALS: /64 (BP Location: Right arm, Patient Position: Sitting, Cuff Size: Adult Large)   Pulse 68   Resp 12   Ht 1.778 m (5' 10\")   Wt 108.1 kg (238 lb 6.4 oz)   BMI 34.21 kg/m    Wt Readings from Last 3 Encounters:   02/27/25 107.5 kg (237 lb)   02/14/24 107 kg (236 lb)   11/13/23 107.5 kg (237 lb)     CONSTITUTIONAL: well nourished, comfortable, no distress  EYES:  Conjunctivae pink, sclerae clear.    E/N/T:  Oral mucosa pink  RESPIRATORY:  Respiratory effort is normal  CARDIOVASCULAR:  normal S1 and S2  GASTROINTESTINAL:  Abdomen without masses or tenderness  EXTREMITIES:  No clubbing or cyanosis.    MUSCULOSKELETAL:  Overall grossly normal muscle strength  SKIN:  Overall, skin warm and dry, no lesions.  NEURO/PSYCH:  Oriented x 3 with normal affect.   Constitutional:  No weight loss or loss of appetite    Eyes:  No difficulty with vision, no double vision, no dry eyes  ENT:  No sore throat, difficulty swallowing; changes in hearing or tinnitus  Cardiovascular: As detailed above  Respiratory:  No cough  Musculoskeletal  No joint pain, muscle aches  Neurologic:  No syncope, lightheadedness, fainting spells   Hematologic: No easy bruising, excessive bleeding tendency   Gastrointestinal:  No jaundice, abdominal pain or abdominal " bloating  Genitourinary: No changes in urinary habits, no trouble urinating    Psychiatric: No anxiety or depression      Medical History  Surgical History   Past Medical History:   Diagnosis Date    Benign essential hypertension 2023    Bilateral carotid artery stenosis 2023    Diverticulosis 2023    Elevated coronary artery calcium score 2023    Elevated PSA 2023    Hyperlipidemia, unspecified hyperlipidemia type 2023    Paroxysmal atrial fibrillation (H) 2023    Type 2 diabetes mellitus without complication, without long-term current use of insulin (H) 2023    Past Surgical History:   Procedure Laterality Date    CAROTID ENDARTERECTOMY Left 2022    INGUINAL HERNIA REPAIR      UMBILICAL HERNIA REPAIR           Family History Social History   No family history on file.     Social History     Tobacco Use    Smoking status: Former     Current packs/day: 0.00     Types: Cigarettes     Quit date:      Years since quittin.2    Smokeless tobacco: Never   Substance Use Topics    Drug use: Never         Medications  Allergies     Current Outpatient Medications:     ELIQUIS ANTICOAGULANT 5 MG tablet, Take 1 tablet (5 mg) by mouth every 12 hours., Disp: 180 tablet, Rfl: 3    fluticasone (FLONASE) 50 MCG/ACT nasal spray, Spray 1 spray into both nostrils daily as needed, Disp: , Rfl:     latanoprost (XALATAN) 0.005 % ophthalmic solution, Place 1 drop into both eyes At Bedtime, Disp: , Rfl:     lisinopril (ZESTRIL) 20 MG tablet, Take 1 tablet by mouth 2 times daily., Disp: , Rfl:     metFORMIN (GLUCOPHAGE XR) 500 MG 24 hr tablet, Take 2-4 tablets by mouth daily (with dinner)., Disp: , Rfl:     metoprolol succinate ER (TOPROL XL) 25 MG 24 hr tablet, Take 1 tablet (25 mg) by mouth daily., Disp: 90 tablet, Rfl: 3    rosuvastatin (CRESTOR) 40 MG tablet, Take 1 tablet by mouth daily at 2 pm., Disp: , Rfl:    No Known Allergies       Lab Results    Chemistry CBC Cardiac  "Enzymes/BNP/TSH/INR   Recent Labs   Lab Test 03/13/25  0840      POTASSIUM 4.4   CHLORIDE 101   CO2 23   *   BUN 16.3   CR 1.07   GFRESTIMATED 74   MEREDITH 9.3     Recent Labs   Lab Test 03/13/25  0840 09/05/24  0845 02/21/24  0918   CR 1.07 1.20* 1.08          No results for input(s): \"WBC\", \"HGB\", \"HCT\", \"MCV\", \"PLT\" in the last 10595 hours.  No results for input(s): \"HGB\" in the last 55870 hours. No results for input(s): \"TROPONINI\" in the last 94720 hours.  No results for input(s): \"BNP\", \"NTBNPI\", \"NTBNP\" in the last 03096 hours.  Recent Labs   Lab Test 08/11/23  1314   TSH 2.18     No results for input(s): \"INR\" in the last 46285 hours.      Data Review    ECGs (tracings independently reviewed)  8/11/2023 - AF, 131bpm, QTC 432ms    12/13/2023 monitoring (independently reviewed)  No AF    8/23/2023 TTE      9/18/2023 MPI    The exercise nuclear stress test is negative for inducible myocardial ischemia or infarction.    The exercise stress electrocardiogram is negative for inducible ischemic EKG changes.    The patient's exercise capacity is average for age and gender. The patient exercised for 8:59 minutes on the Amado protocol, achieving 10.3 METs and 91% the age-predicted maximum heart rate. The patient had no symptoms.    The left ventricular ejection fraction at stress is greater than 70%.    The patient is at a low risk of future cardiac ischemic events.    There is no prior study for comparison.       Cc: Lashell Anthony CNP, Amado Baez MD Amila Dilusha William, MD  3/19/2025  7:31 AM      "

## 2025-04-11 ENCOUNTER — LAB (OUTPATIENT)
Dept: CARDIOLOGY | Facility: CLINIC | Age: 70
End: 2025-04-11
Payer: COMMERCIAL

## 2025-04-11 DIAGNOSIS — I48.0 PAROXYSMAL ATRIAL FIBRILLATION (H): ICD-10-CM

## 2025-04-11 LAB
ANION GAP SERPL CALCULATED.3IONS-SCNC: 6 MMOL/L (ref 7–15)
ATRIAL RATE - MUSE: 66 BPM
BUN SERPL-MCNC: 17.7 MG/DL (ref 8–23)
CALCIUM SERPL-MCNC: 9.1 MG/DL (ref 8.8–10.4)
CHLORIDE SERPL-SCNC: 104 MMOL/L (ref 98–107)
CREAT SERPL-MCNC: 1.09 MG/DL (ref 0.67–1.17)
DIASTOLIC BLOOD PRESSURE - MUSE: NORMAL MMHG
EGFRCR SERPLBLD CKD-EPI 2021: 73 ML/MIN/1.73M2
ERYTHROCYTE [DISTWIDTH] IN BLOOD BY AUTOMATED COUNT: 12.4 % (ref 10–15)
GLUCOSE SERPL-MCNC: 136 MG/DL (ref 70–99)
HCO3 SERPL-SCNC: 28 MMOL/L (ref 22–29)
HCT VFR BLD AUTO: 40.7 % (ref 40–53)
HGB BLD-MCNC: 13.8 G/DL (ref 13.3–17.7)
INTERPRETATION ECG - MUSE: NORMAL
MCH RBC QN AUTO: 30.9 PG (ref 26.5–33)
MCHC RBC AUTO-ENTMCNC: 33.9 G/DL (ref 31.5–36.5)
MCV RBC AUTO: 91 FL (ref 78–100)
P AXIS - MUSE: 46 DEGREES
PLATELET # BLD AUTO: 208 10E3/UL (ref 150–450)
POTASSIUM SERPL-SCNC: 4.5 MMOL/L (ref 3.4–5.3)
PR INTERVAL - MUSE: 232 MS
QRS DURATION - MUSE: 90 MS
QT - MUSE: 394 MS
QTC - MUSE: 413 MS
R AXIS - MUSE: 47 DEGREES
RBC # BLD AUTO: 4.47 10E6/UL (ref 4.4–5.9)
SODIUM SERPL-SCNC: 138 MMOL/L (ref 135–145)
SYSTOLIC BLOOD PRESSURE - MUSE: NORMAL MMHG
T AXIS - MUSE: 51 DEGREES
VENTRICULAR RATE- MUSE: 66 BPM
WBC # BLD AUTO: 6.6 10E3/UL (ref 4–11)

## 2025-04-11 PROCEDURE — 36415 COLL VENOUS BLD VENIPUNCTURE: CPT

## 2025-04-11 PROCEDURE — 80048 BASIC METABOLIC PNL TOTAL CA: CPT

## 2025-04-11 PROCEDURE — 85027 COMPLETE CBC AUTOMATED: CPT

## 2025-04-11 PROCEDURE — 93000 ELECTROCARDIOGRAM COMPLETE: CPT | Performed by: INTERNAL MEDICINE

## 2025-04-14 ENCOUNTER — ANESTHESIA EVENT (OUTPATIENT)
Dept: CARDIOLOGY | Facility: HOSPITAL | Age: 70
End: 2025-04-14
Payer: COMMERCIAL

## 2025-04-14 ENCOUNTER — HOSPITAL ENCOUNTER (OUTPATIENT)
Facility: HOSPITAL | Age: 70
Discharge: HOME | End: 2025-04-14
Attending: INTERNAL MEDICINE | Admitting: INTERNAL MEDICINE
Payer: COMMERCIAL

## 2025-04-14 ENCOUNTER — ANESTHESIA (OUTPATIENT)
Dept: CARDIOLOGY | Facility: HOSPITAL | Age: 70
End: 2025-04-14
Payer: COMMERCIAL

## 2025-04-14 VITALS
BODY MASS INDEX: 32.93 KG/M2 | RESPIRATION RATE: 13 BRPM | HEIGHT: 70 IN | OXYGEN SATURATION: 96 % | WEIGHT: 230 LBS | TEMPERATURE: 98.2 F | HEART RATE: 75 BPM | SYSTOLIC BLOOD PRESSURE: 149 MMHG | DIASTOLIC BLOOD PRESSURE: 73 MMHG

## 2025-04-14 DIAGNOSIS — I48.0 PAROXYSMAL ATRIAL FIBRILLATION (H): ICD-10-CM

## 2025-04-14 LAB
ACT BLD: 467 SECONDS (ref 74–150)
ACT BLD: 650 SECONDS (ref 74–150)
ATRIAL RATE - MUSE: 52 BPM
DIASTOLIC BLOOD PRESSURE - MUSE: NORMAL MMHG
GLUCOSE BLDC GLUCOMTR-MCNC: 134 MG/DL (ref 70–99)
INTERPRETATION ECG - MUSE: NORMAL
P AXIS - MUSE: 59 DEGREES
PR INTERVAL - MUSE: 220 MS
QRS DURATION - MUSE: 92 MS
QT - MUSE: 476 MS
QTC - MUSE: 442 MS
R AXIS - MUSE: 55 DEGREES
SYSTOLIC BLOOD PRESSURE - MUSE: NORMAL MMHG
T AXIS - MUSE: 54 DEGREES
VENTRICULAR RATE- MUSE: 52 BPM

## 2025-04-14 PROCEDURE — C1759 CATH, INTRA ECHOCARDIOGRAPHY: HCPCS | Performed by: INTERNAL MEDICINE

## 2025-04-14 PROCEDURE — 250N000011 HC RX IP 250 OP 636: Performed by: ANESTHESIOLOGY

## 2025-04-14 PROCEDURE — 93010 ELECTROCARDIOGRAM REPORT: CPT | Performed by: INTERNAL MEDICINE

## 2025-04-14 PROCEDURE — C1766 INTRO/SHEATH,STRBLE,NON-PEEL: HCPCS | Performed by: INTERNAL MEDICINE

## 2025-04-14 PROCEDURE — C1730 CATH, EP, 19 OR FEW ELECT: HCPCS | Performed by: INTERNAL MEDICINE

## 2025-04-14 PROCEDURE — C1769 GUIDE WIRE: HCPCS | Performed by: INTERNAL MEDICINE

## 2025-04-14 PROCEDURE — 710N000010 HC RECOVERY PHASE 1, LEVEL 2, PER MIN

## 2025-04-14 PROCEDURE — C1733 CATH, EP, OTHR THAN COOL-TIP: HCPCS | Performed by: INTERNAL MEDICINE

## 2025-04-14 PROCEDURE — 370N000017 HC ANESTHESIA TECHNICAL FEE, PER MIN: Performed by: INTERNAL MEDICINE

## 2025-04-14 PROCEDURE — C1887 CATHETER, GUIDING: HCPCS | Performed by: INTERNAL MEDICINE

## 2025-04-14 PROCEDURE — 258N000003 HC RX IP 258 OP 636: Performed by: ANESTHESIOLOGY

## 2025-04-14 PROCEDURE — 250N000009 HC RX 250: Performed by: ANESTHESIOLOGY

## 2025-04-14 PROCEDURE — 258N000003 HC RX IP 258 OP 636: Performed by: NURSE ANESTHETIST, CERTIFIED REGISTERED

## 2025-04-14 PROCEDURE — 82962 GLUCOSE BLOOD TEST: CPT

## 2025-04-14 PROCEDURE — 85347 COAGULATION TIME ACTIVATED: CPT

## 2025-04-14 PROCEDURE — C1732 CATH, EP, DIAG/ABL, 3D/VECT: HCPCS | Performed by: INTERNAL MEDICINE

## 2025-04-14 PROCEDURE — 93623 PRGRMD STIMJ&PACG IV RX NFS: CPT | Performed by: INTERNAL MEDICINE

## 2025-04-14 PROCEDURE — 93005 ELECTROCARDIOGRAM TRACING: CPT

## 2025-04-14 PROCEDURE — 272N000001 HC OR GENERAL SUPPLY STERILE: Performed by: INTERNAL MEDICINE

## 2025-04-14 PROCEDURE — C1894 INTRO/SHEATH, NON-LASER: HCPCS | Performed by: INTERNAL MEDICINE

## 2025-04-14 PROCEDURE — 999N000054 HC STATISTIC EKG NON-CHARGEABLE

## 2025-04-14 PROCEDURE — 93656 COMPRE EP EVAL ABLTJ ATR FIB: CPT | Performed by: INTERNAL MEDICINE

## 2025-04-14 PROCEDURE — 250N000011 HC RX IP 250 OP 636: Mod: JW | Performed by: INTERNAL MEDICINE

## 2025-04-14 PROCEDURE — 258N000003 HC RX IP 258 OP 636: Performed by: INTERNAL MEDICINE

## 2025-04-14 PROCEDURE — 250N000009 HC RX 250: Performed by: INTERNAL MEDICINE

## 2025-04-14 RX ORDER — SODIUM CHLORIDE 9 MG/ML
INJECTION, SOLUTION INTRAVENOUS CONTINUOUS PRN
Status: DISCONTINUED | OUTPATIENT
Start: 2025-04-14 | End: 2025-04-14

## 2025-04-14 RX ORDER — ONDANSETRON 4 MG/1
4 TABLET, ORALLY DISINTEGRATING ORAL EVERY 30 MIN PRN
Status: DISCONTINUED | OUTPATIENT
Start: 2025-04-14 | End: 2025-04-14 | Stop reason: HOSPADM

## 2025-04-14 RX ORDER — HEPARIN SODIUM 1000 [USP'U]/ML
INJECTION, SOLUTION INTRAVENOUS; SUBCUTANEOUS
Status: DISCONTINUED | OUTPATIENT
Start: 2025-04-14 | End: 2025-04-14 | Stop reason: HOSPADM

## 2025-04-14 RX ORDER — NALOXONE HYDROCHLORIDE 0.4 MG/ML
0.1 INJECTION, SOLUTION INTRAMUSCULAR; INTRAVENOUS; SUBCUTANEOUS
Status: DISCONTINUED | OUTPATIENT
Start: 2025-04-14 | End: 2025-04-14 | Stop reason: HOSPADM

## 2025-04-14 RX ORDER — FENTANYL CITRATE 50 UG/ML
25 INJECTION, SOLUTION INTRAMUSCULAR; INTRAVENOUS EVERY 5 MIN PRN
Status: DISCONTINUED | OUTPATIENT
Start: 2025-04-14 | End: 2025-04-14 | Stop reason: HOSPADM

## 2025-04-14 RX ORDER — MEPERIDINE HYDROCHLORIDE 25 MG/ML
12.5 INJECTION INTRAMUSCULAR; INTRAVENOUS; SUBCUTANEOUS EVERY 5 MIN PRN
Status: DISCONTINUED | OUTPATIENT
Start: 2025-04-14 | End: 2025-04-14 | Stop reason: HOSPADM

## 2025-04-14 RX ORDER — OXYCODONE HYDROCHLORIDE 5 MG/1
10 TABLET ORAL
Status: DISCONTINUED | OUTPATIENT
Start: 2025-04-14 | End: 2025-04-14 | Stop reason: HOSPADM

## 2025-04-14 RX ORDER — ACETAMINOPHEN 325 MG/1
975 TABLET ORAL ONCE
Status: DISCONTINUED | OUTPATIENT
Start: 2025-04-14 | End: 2025-04-14 | Stop reason: HOSPADM

## 2025-04-14 RX ORDER — FENTANYL CITRATE 50 UG/ML
INJECTION, SOLUTION INTRAMUSCULAR; INTRAVENOUS PRN
Status: DISCONTINUED | OUTPATIENT
Start: 2025-04-14 | End: 2025-04-14

## 2025-04-14 RX ORDER — LIDOCAINE 40 MG/G
CREAM TOPICAL
Status: DISCONTINUED | OUTPATIENT
Start: 2025-04-14 | End: 2025-04-14 | Stop reason: HOSPADM

## 2025-04-14 RX ORDER — BUPIVACAINE HYDROCHLORIDE 2.5 MG/ML
INJECTION, SOLUTION EPIDURAL; INFILTRATION; INTRACAUDAL; PERINEURAL
Status: DISCONTINUED | OUTPATIENT
Start: 2025-04-14 | End: 2025-04-14 | Stop reason: HOSPADM

## 2025-04-14 RX ORDER — PROTAMINE SULFATE 10 MG/ML
INJECTION, SOLUTION INTRAVENOUS PRN
Status: DISCONTINUED | OUTPATIENT
Start: 2025-04-14 | End: 2025-04-14

## 2025-04-14 RX ORDER — HYDROXYZINE HYDROCHLORIDE 10 MG/1
10 TABLET, FILM COATED ORAL EVERY 6 HOURS PRN
Status: DISCONTINUED | OUTPATIENT
Start: 2025-04-14 | End: 2025-04-14 | Stop reason: HOSPADM

## 2025-04-14 RX ORDER — SODIUM CHLORIDE, SODIUM LACTATE, POTASSIUM CHLORIDE, CALCIUM CHLORIDE 600; 310; 30; 20 MG/100ML; MG/100ML; MG/100ML; MG/100ML
INJECTION, SOLUTION INTRAVENOUS CONTINUOUS
Status: DISCONTINUED | OUTPATIENT
Start: 2025-04-14 | End: 2025-04-14 | Stop reason: HOSPADM

## 2025-04-14 RX ORDER — IBUPROFEN 600 MG/1
600 TABLET, FILM COATED ORAL EVERY 6 HOURS PRN
Status: DISCONTINUED | OUTPATIENT
Start: 2025-04-14 | End: 2025-04-14 | Stop reason: HOSPADM

## 2025-04-14 RX ORDER — DIMENHYDRINATE 50 MG/ML
25 INJECTION, SOLUTION INTRAMUSCULAR; INTRAVENOUS
Status: DISCONTINUED | OUTPATIENT
Start: 2025-04-14 | End: 2025-04-14 | Stop reason: HOSPADM

## 2025-04-14 RX ORDER — PROPOFOL 10 MG/ML
INJECTION, EMULSION INTRAVENOUS PRN
Status: DISCONTINUED | OUTPATIENT
Start: 2025-04-14 | End: 2025-04-14

## 2025-04-14 RX ORDER — ONDANSETRON 2 MG/ML
4 INJECTION INTRAMUSCULAR; INTRAVENOUS EVERY 6 HOURS PRN
Status: DISCONTINUED | OUTPATIENT
Start: 2025-04-14 | End: 2025-04-14 | Stop reason: HOSPADM

## 2025-04-14 RX ORDER — LIDOCAINE HYDROCHLORIDE AND EPINEPHRINE 10; 10 MG/ML; UG/ML
INJECTION, SOLUTION INFILTRATION; PERINEURAL
Status: DISCONTINUED | OUTPATIENT
Start: 2025-04-14 | End: 2025-04-14 | Stop reason: HOSPADM

## 2025-04-14 RX ORDER — HALOPERIDOL 5 MG/ML
2 INJECTION INTRAMUSCULAR
Status: DISCONTINUED | OUTPATIENT
Start: 2025-04-14 | End: 2025-04-14 | Stop reason: HOSPADM

## 2025-04-14 RX ORDER — DEXAMETHASONE SODIUM PHOSPHATE 10 MG/ML
INJECTION, SOLUTION INTRAMUSCULAR; INTRAVENOUS PRN
Status: DISCONTINUED | OUTPATIENT
Start: 2025-04-14 | End: 2025-04-14

## 2025-04-14 RX ORDER — ONDANSETRON 2 MG/ML
INJECTION INTRAMUSCULAR; INTRAVENOUS PRN
Status: DISCONTINUED | OUTPATIENT
Start: 2025-04-14 | End: 2025-04-14

## 2025-04-14 RX ORDER — SODIUM CHLORIDE 9 MG/ML
100 INJECTION, SOLUTION INTRAVENOUS CONTINUOUS
Status: DISCONTINUED | OUTPATIENT
Start: 2025-04-14 | End: 2025-04-14 | Stop reason: HOSPADM

## 2025-04-14 RX ORDER — OXYCODONE HYDROCHLORIDE 5 MG/1
5 TABLET ORAL
Status: DISCONTINUED | OUTPATIENT
Start: 2025-04-14 | End: 2025-04-14 | Stop reason: HOSPADM

## 2025-04-14 RX ORDER — ONDANSETRON 4 MG/1
4 TABLET, ORALLY DISINTEGRATING ORAL EVERY 6 HOURS PRN
Status: DISCONTINUED | OUTPATIENT
Start: 2025-04-14 | End: 2025-04-14 | Stop reason: HOSPADM

## 2025-04-14 RX ORDER — DEXMEDETOMIDINE HYDROCHLORIDE 4 UG/ML
.1-1.5 INJECTION, SOLUTION INTRAVENOUS CONTINUOUS
Status: DISCONTINUED | OUTPATIENT
Start: 2025-04-14 | End: 2025-04-14 | Stop reason: HOSPADM

## 2025-04-14 RX ORDER — DEXAMETHASONE SODIUM PHOSPHATE 4 MG/ML
4 INJECTION, SOLUTION INTRA-ARTICULAR; INTRALESIONAL; INTRAMUSCULAR; INTRAVENOUS; SOFT TISSUE
Status: DISCONTINUED | OUTPATIENT
Start: 2025-04-14 | End: 2025-04-14 | Stop reason: HOSPADM

## 2025-04-14 RX ORDER — ONDANSETRON 2 MG/ML
4 INJECTION INTRAMUSCULAR; INTRAVENOUS EVERY 30 MIN PRN
Status: DISCONTINUED | OUTPATIENT
Start: 2025-04-14 | End: 2025-04-14 | Stop reason: HOSPADM

## 2025-04-14 RX ORDER — ACETAMINOPHEN 325 MG/1
975 TABLET ORAL
Status: DISCONTINUED | OUTPATIENT
Start: 2025-04-14 | End: 2025-04-14 | Stop reason: HOSPADM

## 2025-04-14 RX ORDER — HYDROMORPHONE HCL IN WATER/PF 6 MG/30 ML
0.4 PATIENT CONTROLLED ANALGESIA SYRINGE INTRAVENOUS EVERY 5 MIN PRN
Status: DISCONTINUED | OUTPATIENT
Start: 2025-04-14 | End: 2025-04-14 | Stop reason: HOSPADM

## 2025-04-14 RX ORDER — HYDROMORPHONE HCL IN WATER/PF 6 MG/30 ML
0.2 PATIENT CONTROLLED ANALGESIA SYRINGE INTRAVENOUS EVERY 5 MIN PRN
Status: DISCONTINUED | OUTPATIENT
Start: 2025-04-14 | End: 2025-04-14 | Stop reason: HOSPADM

## 2025-04-14 RX ORDER — LORAZEPAM 2 MG/ML
.5-1 INJECTION INTRAMUSCULAR
Status: DISCONTINUED | OUTPATIENT
Start: 2025-04-14 | End: 2025-04-14 | Stop reason: HOSPADM

## 2025-04-14 RX ORDER — ALBUTEROL SULFATE 0.83 MG/ML
2.5 SOLUTION RESPIRATORY (INHALATION) EVERY 4 HOURS PRN
Status: DISCONTINUED | OUTPATIENT
Start: 2025-04-14 | End: 2025-04-14 | Stop reason: HOSPADM

## 2025-04-14 RX ORDER — FENTANYL CITRATE 50 UG/ML
50 INJECTION, SOLUTION INTRAMUSCULAR; INTRAVENOUS EVERY 5 MIN PRN
Status: DISCONTINUED | OUTPATIENT
Start: 2025-04-14 | End: 2025-04-14 | Stop reason: HOSPADM

## 2025-04-14 RX ORDER — FENTANYL CITRATE 50 UG/ML
25 INJECTION, SOLUTION INTRAMUSCULAR; INTRAVENOUS
Status: DISCONTINUED | OUTPATIENT
Start: 2025-04-14 | End: 2025-04-14 | Stop reason: HOSPADM

## 2025-04-14 RX ORDER — ACETAMINOPHEN 325 MG/1
650 TABLET ORAL EVERY 4 HOURS PRN
Status: DISCONTINUED | OUTPATIENT
Start: 2025-04-14 | End: 2025-04-14 | Stop reason: HOSPADM

## 2025-04-14 RX ORDER — HEPARIN SODIUM 10000 [USP'U]/100ML
INJECTION, SOLUTION INTRAVENOUS CONTINUOUS PRN
Status: DISCONTINUED | OUTPATIENT
Start: 2025-04-14 | End: 2025-04-14 | Stop reason: HOSPADM

## 2025-04-14 RX ADMIN — DEXAMETHASONE SODIUM PHOSPHATE 5 MG: 10 INJECTION, SOLUTION INTRAMUSCULAR; INTRAVENOUS at 11:00

## 2025-04-14 RX ADMIN — PHENYLEPHRINE HYDROCHLORIDE 0.5 MCG/KG/MIN: 10 INJECTION INTRAVENOUS at 11:13

## 2025-04-14 RX ADMIN — ROCURONIUM 50 MG: 50 INJECTION, SOLUTION INTRAVENOUS at 10:55

## 2025-04-14 RX ADMIN — SODIUM CHLORIDE: 9 INJECTION, SOLUTION INTRAVENOUS at 11:45

## 2025-04-14 RX ADMIN — SUGAMMADEX 200 MG: 100 INJECTION, SOLUTION INTRAVENOUS at 12:06

## 2025-04-14 RX ADMIN — SODIUM CHLORIDE 100 ML/HR: 0.9 INJECTION, SOLUTION INTRAVENOUS at 08:39

## 2025-04-14 RX ADMIN — PHENYLEPHRINE HYDROCHLORIDE 100 MCG: 10 INJECTION INTRAVENOUS at 11:12

## 2025-04-14 RX ADMIN — SODIUM CHLORIDE: 9 INJECTION, SOLUTION INTRAVENOUS at 10:30

## 2025-04-14 RX ADMIN — FENTANYL CITRATE 100 MCG: 50 INJECTION, SOLUTION INTRAMUSCULAR; INTRAVENOUS at 10:51

## 2025-04-14 RX ADMIN — ONDANSETRON 4 MG: 2 INJECTION INTRAMUSCULAR; INTRAVENOUS at 12:07

## 2025-04-14 RX ADMIN — PROTAMINE SULFATE 50 MG: 10 INJECTION, SOLUTION INTRAVENOUS at 12:05

## 2025-04-14 RX ADMIN — PHENYLEPHRINE HYDROCHLORIDE 100 MCG: 10 INJECTION INTRAVENOUS at 11:15

## 2025-04-14 RX ADMIN — PROPOFOL 200 MG: 10 INJECTION, EMULSION INTRAVENOUS at 10:55

## 2025-04-14 ASSESSMENT — ACTIVITIES OF DAILY LIVING (ADL)
ADLS_ACUITY_SCORE: 41

## 2025-04-14 ASSESSMENT — ENCOUNTER SYMPTOMS: DYSRHYTHMIAS: 1

## 2025-04-14 NOTE — ANESTHESIA PREPROCEDURE EVALUATION
Anesthesia Pre-Procedure Evaluation    Patient: Kael Dubois   MRN: [id number] : 1955        Procedure : Procedure(s):  Ablation Atrial Fibrillation          Past Medical History:   Diagnosis Date    Benign essential hypertension 2023    Bilateral carotid artery stenosis 2023    Diverticulosis 2023    Elevated coronary artery calcium score 2023    Elevated PSA 2023    Hyperlipidemia, unspecified hyperlipidemia type 2023    Paroxysmal atrial fibrillation (H) 2023    Type 2 diabetes mellitus without complication, without long-term current use of insulin (H) 2023      Past Surgical History:   Procedure Laterality Date    CAROTID ENDARTERECTOMY Left 2022    INGUINAL HERNIA REPAIR      UMBILICAL HERNIA REPAIR        No Known Allergies   Social History     Tobacco Use    Smoking status: Former     Current packs/day: 0.00     Types: Cigarettes, Cigars     Quit date:      Years since quittin.3    Smokeless tobacco: Never    Tobacco comments:     Occasional cigar    Substance Use Topics    Alcohol use: Not on file      Wt Readings from Last 1 Encounters:   25 104.3 kg (230 lb)        Anesthesia Evaluation            ROS/MED HX  ENT/Pulmonary:       Neurologic:       Cardiovascular: Comment: Echo 2023    1. Technically difficult study  2. Normal resting LV size, EF 55-60% No RWMA  3. Normal left ventricular diastolic function  4. Structurally and functionally unremarkable cardiac valves.  5. IVC findings suggest right atrial pressure of 0-5mmHg  6. Patient incidentally noted to be in sinus rhythm during echocardiographic imaging.    (+) Dyslipidemia hypertension- -  CAD -  - -                        dysrhythmias, a-fib,          (-) murmur and wheezes   METS/Exercise Tolerance:     Hematologic:       Musculoskeletal:       GI/Hepatic:       Renal/Genitourinary:       Endo:     (+)  type II DM,                    Psychiatric/Substance Use:      "  Infectious Disease:       Malignancy:       Other:            Physical Exam    Airway        Mallampati: II   TM distance: > 3 FB   Neck ROM: full   Mouth opening: > 3 cm    Respiratory Devices and Support         Dental  no notable dental history         Cardiovascular          Rhythm and rate: irregular and normal (-) no systolic click and no murmur    Pulmonary   pulmonary exam normal        breath sounds clear to auscultation   (-) no wheezes        OUTSIDE LABS:  CBC:   Lab Results   Component Value Date    WBC 6.6 04/11/2025    HGB 13.8 04/11/2025    HCT 40.7 04/11/2025     04/11/2025     BMP:   Lab Results   Component Value Date     04/11/2025     03/13/2025    POTASSIUM 4.5 04/11/2025    POTASSIUM 4.4 03/13/2025    CHLORIDE 104 04/11/2025    CHLORIDE 101 03/13/2025    CO2 28 04/11/2025    CO2 23 03/13/2025    BUN 17.7 04/11/2025    BUN 16.3 03/13/2025    CR 1.09 04/11/2025    CR 1.07 03/13/2025     (H) 04/11/2025     (H) 03/13/2025     COAGS: No results found for: \"PTT\", \"INR\", \"FIBR\"  POC: No results found for: \"BGM\", \"HCG\", \"HCGS\"  HEPATIC:   Lab Results   Component Value Date    ALBUMIN 4.6 08/11/2023    PROTTOTAL 6.8 08/11/2023    ALT 31 08/11/2023    AST 26 08/11/2023    ALKPHOS 74 08/11/2023    BILITOTAL 0.9 08/11/2023     OTHER:   Lab Results   Component Value Date    A1C 6.4 (H) 12/06/2019    MEREDITH 9.1 04/11/2025    TSH 2.18 08/11/2023       Anesthesia Plan    ASA Status:  3    NPO Status:  NPO Appropriate    Anesthesia Type: General.     - Airway: ETT   Induction: Intravenous.   Maintenance: Inhalation.        Consents    Anesthesia Plan(s) and associated risks, benefits, and realistic alternatives discussed. Questions answered and patient/representative(s) expressed understanding.     - Discussed: Risks, Benefits and Alternatives for BOTH SEDATION and the PROCEDURE were discussed     - Discussed with:  Patient      - Extended Intubation/Ventilatory Support " "Discussed: Yes.      - Patient is DNR/DNI Status: No     Use of blood products discussed: Yes.     - Discussed with: Patient.     Postoperative Care    Pain management: IV analgesics.   PONV prophylaxis: Ondansetron (or other 5HT-3), Dexamethasone or Solumedrol     Comments:               Christopher J. Behrens, MD    Clinically Significant Risk Factors Present on Admission                   # Hypertension: Noted on problem list           # Obesity: Estimated body mass index is 33 kg/m  as calculated from the following:    Height as of 4/11/25: 1.778 m (5' 10\").    Weight as of 4/11/25: 104.3 kg (230 lb).                "

## 2025-04-14 NOTE — ANESTHESIA PROCEDURE NOTES
Airway       Patient location during procedure: OR       Procedure Start/Stop Times: 4/14/2025 10:57 AM  Staff -        CRNA: Kaley Suarez APRN CRNA       Performed By: CRNA  Consent for Airway        Urgency: elective  Indications and Patient Condition       Indications for airway management: jerrica-procedural       Induction type:intravenous       Mask difficulty assessment: 2 - vent by mask + OA or adjuvant +/- NMBA    Final Airway Details       Final airway type: endotracheal airway       Successful airway: ETT - single and Oral  Endotracheal Airway Details        ETT size (mm): 8.0       Cuffed: yes       Cuff volume (mL): 10       Successful intubation technique: video laryngoscopy       VL Blade Size: Glidescope 4       Grade View of Cords: 1       Adjucts: stylet       Position: Right       Measured from: lips       Secured at (cm): 24       Bite block used: Soft    Post intubation assessment        Placement verified by: capnometry, equal breath sounds and chest rise        Number of attempts at approach: 1       Secured with: tape       Ease of procedure: easy       Dentition: Intact and Unchanged    Medication(s) Administered   Medication Administration Time: 4/14/2025 10:57 AM

## 2025-04-14 NOTE — ANESTHESIA POSTPROCEDURE EVALUATION
Patient: Kael Dubois    Procedure: Procedure(s):  Ablation Atrial Fibrillation       Anesthesia Type:  General    Note:  Disposition: Outpatient   Postop Pain Control: Uneventful            Sign Out: Well controlled pain   PONV: No   Neuro/Psych: Uneventful            Sign Out: Acceptable/Baseline neuro status   Airway/Respiratory: Uneventful            Sign Out: Acceptable/Baseline resp. status   CV/Hemodynamics: Uneventful            Sign Out: Acceptable CV status   Other NRE: NONE   DID A NON-ROUTINE EVENT OCCUR? No           Last vitals:  Vitals Value Taken Time   /60 04/14/25 1315   Temp     Pulse 62 04/14/25 1320   Resp     SpO2 100 % 04/14/25 1320   Vitals shown include unfiled device data.    Electronically Signed By: Christopher J. Behrens, MD  April 14, 2025  1:22 PM

## 2025-04-14 NOTE — ANESTHESIA CARE TRANSFER NOTE
Patient: Kael Dubois    Procedure: Procedure(s):  Ablation Atrial Fibrillation       Diagnosis: AF  Diagnosis Additional Information: No value filed.    Anesthesia Type:   General     Note:    Oropharynx: spontaneously breathing and oropharynx clear of all foreign objects  Level of Consciousness: awake  Oxygen Supplementation: face mask  Level of Supplemental Oxygen (L/min / FiO2): 6  Independent Airway: airway patency satisfactory and stable  Dentition: dentition unchanged  Vital Signs Stable: post-procedure vital signs reviewed and stable  Report to RN Given: handoff report given  Patient transferred to: [facility]          Vitals:Taken at 1221  Vitals Value Taken Time   /69    Temp 98.1 ax     Pulse 66    Resp 12    SpO2 96        Electronically Signed By: AARON Blanco CRNA  April 14, 2025  12:23 PM

## 2025-04-14 NOTE — Clinical Note
[employer] Med system 12 lead EKG, hemodynamics 5 lead, pulse oximetery, NIBP, Physiocontrol hands off defibrillator/external pacer, with 3 monitoring leads to patient. Baseline assessment done.

## 2025-04-14 NOTE — Clinical Note
[employer] Med system 12 lead EKG, hemodynamics 5 lead, pulse oximetery, NIBP, Physiocontrol hands off defibrillator/external pacer, with 3 monitoring leads to patient. Baseline assessment done.

## 2025-04-14 NOTE — DISCHARGE INSTRUCTIONS
[facility]  Cardiac Electrophysiology  [address]  [place] [address]   Office: [phone]  Fax: [phone]       Cardiac Electrophysiology - Post Ablation Discharge Instructions      PROCEDURE   Atrial fibrillation ablation         MEDICATION INSTRUCTIONS   Continue taking your prior to procedure medications.  Continue taking your blood thinner apixaban (Eliquis).          DISCHARGE INSTRUCTIONS   Medications   Take your medications as prescribed.   It is important for you to continue your blood thinner without interruption, unless your electrophysiologist instructs you otherwise.     General instructions   Have an adult stay with you until tomorrow.   You may resume your normal diet.     You may shower tomorrow.  Do not take a bath, or use a hot tub or pool for at least 1 week.    Activity recommendations   Do not drive for 3 days.   Avoid stooping or squatting more than 90 degrees at the hips for 7 days.   Avoid repetitive motions such as loading , vacuuming, raking or shoveling for 7 days.   Avoid heavy lifting (greater than 25lbs) for 7 days.       Groin care instructions   For the first 24 hours after your ablation, check the groin access sites every 1-2 hours while awake.   You may keep a bandaid over the puncture sites for 1 or 2 days post-procedure and thereafter may keep these sites uncovered.  Change the bandaid daily.  If there is minor oozing, apply another bandaid and remove it after 12 hours.   For 2 days, when you cough, sneeze, laugh or move your bowels, hold your hand over the puncture sites and press firmly.   Do not scrub the groin access sites.   Do not use lotion or powder near the groin access sites.       Arrhythmias following ablation  Recurrent atrial fibrillation and palpitations are common within the first 3 months post ablation while your heart recovers from the procedure.  These are usually more frequent in the first few weeks  following ablation and should occur less frequently over time.  Please contact us if you are having frequent or long lasting atrial fibrillation episodes following ablation.    Common findings after ablation  Bruising and a dime or pea size lump at the access sites is common.  If you notice increased swelling, external bleeding, or have other concerns regarding your groin access sites please call your electrophysiology team's office, and if after hours consider emergency department evaluation.   Soreness and mild pain at your groin sites is normal, to help relieve this pain you can apply ice/cold packs to the sites for 20min 3-4 times per day.   Pleuritic chest discomfort (chest pain worse with taking deep breaths, worse with laying flat on your back) can occur after ablation, usually coming about within the first 24-72hrs post ablation.  If this occurs and is severe enough to be troublesome to you, please call us and consider starting a course of ibuprofen 400mg three times daily for 5 to 7 days.     Things to watch for   As with any type of procedure, please be more attentive to unusual symptoms post ablation (eg. fever, neurologic changes, pain with swallowing, loss of consciousness, etc) - we recommend ER evaluation for any such symptoms in the first few weeks post procedure.       Contact the EP Nurse line with any of the following.  Contact the cardiology on-call number after business hours.    Consider ER evaluation for severe symptoms.   Groin pain, swelling, or growing hard lump around the puncture sites.   Groin redness, tenderness, swelling, or drainage (blood or pus).   Neurological changes (for example: leg, arm or face weakness or numbness, difficulties with speech or word finding, problems walking or with your balance, vision changes).   Any numbness, coolness or changes in color in your extremities.  Sudden onset severe abdominal or back pain.  Moderate or severe chest pain not relieved by Tylenol or  Ibuprofen, particularly after the first 48 hours.   Shortness of breath.   Chills or fever greater than 100 F.   Difficulty swallowing food or liquids.  Coughing up blood.   Blood in your stool.  Nausea and vomiting.  Difficulty urinating.  Recurrent atrial fibrillation associated with prolonged rapid heart rates (for example, heart rates over 140bpm for greater than 4 hours) or associated with additional concerning symptoms (for example, chest pain, lightheadedness, loss of consciousness, sweating).     If you are being evaluated at an emergency department, please tell your ER doctor that you have recently underwent an atrial fibrillation ablation and ask the ER to contact our office.  Many special considerations apply following ablation - these may be overlooked during an ER evaluation.      Our office will have a follow-up visit scheduled for you in approximately 6 weeks.  Please do not hesitate to call us before that time should issues arise.         [facility]      To reach the EP nurses working with Dr. Fields:   [phone]        To reach the general [facility] Care [facility] line or after hours service:   [phone]   If you are calling after hours, please listen to the entire voicemail,    a live  will answer at the end of the message.

## 2025-04-14 NOTE — PROGRESS NOTES
Suture removed. Pt walked twice no bleeding noted  Patient is kept comfortable during post-procedure stay. VSS. Denies pain. Right and Left femoral access site remains dry & free from signs of bleeding. Tolerated food and fluids. Ambulated without issues. Appointments made & included in AVS. Dr. Fields was able to speak with patient post procedure. Post-op instructions reviewed and packet given to patient & spouse. Able to ask questions. Verbalized no concerns. Belongings returned. Discharged in stable condition.

## 2025-04-14 NOTE — INTERVAL H&P NOTE
"I have reviewed the surgical (or preoperative) H&P that is linked to this encounter, and examined the patient. There are no significant changes    Clinical Conditions Present on Arrival:  Clinically Significant Risk Factors Present on Admission                 # Drug Induced Coagulation Defect: home medication list includes an anticoagulant medication       # Obesity: Estimated body mass index is 33 kg/m  as calculated from the following:    Height as of this encounter: 1.778 m (5' 10\").    Weight as of this encounter: 104.3 kg (230 lb).       "

## 2025-04-14 NOTE — Clinical Note
Arrhythmia Type: atrial fibrillation.   Method of Cardioversion: synchronous.   The arrhythmia was terminated.   Energy shock delivered: 250 joules.   Post cardioversion rhythm: sinus rhythm.

## 2025-04-16 ENCOUNTER — TELEPHONE (OUTPATIENT)
Dept: CARDIOLOGY | Facility: CLINIC | Age: 70
End: 2025-04-16
Payer: COMMERCIAL

## 2025-04-17 ENCOUNTER — VIRTUAL VISIT (OUTPATIENT)
Dept: CARDIOLOGY | Facility: CLINIC | Age: 70
End: 2025-04-17
Payer: COMMERCIAL

## 2025-04-17 DIAGNOSIS — I48.0 PAROXYSMAL ATRIAL FIBRILLATION (H): Primary | ICD-10-CM

## 2025-04-17 NOTE — PROGRESS NOTES
Post PVI Procedural Follow Up Call    Pt is s/p PVI from 4/14/25 with Dr Landa  PC was placed to pt, spoke to pt    Pt spoke with on call cardiology last night due to elevated. BP. Today 112/64, pulse 68 and is feeling much better.    General Assessment:     Weight: Pt reports weight is at baseline compared to pre procedural weight    Pain: Pt denies generalized or localized pain abnormal to healing s/p     /GI: Pt denies difficulty swallowing, denies constipation, denies urinary retention/difficulty, reports no s/s of infection, report normal appetite, and reports staying hydrated.    Neurological: Pt Denies any neurological changes, or s/s of CVA    Respiratory: Pt denies SOB, denies difficulty breathing, denies throat pain, denies changes/abnormal sputum, and denies any further symptoms abnormal to normal healing process s/p PVI.    Activity: Pt is tolerating advancement in activity while following physical restrictions, staying well hydrated, and gradually working into baseline activity.     Rhythm Assessment:   Pt denies palpitations, denies irregularities in HR or rhythm, and denies symptoms or sustained AF episodes.    Procedure Site Assessment:   Pts some bruising around sites without significant change from hospital discharge and normal to PVI recovery    Anticoagulation/Medication:  Pt remain on Eliquis without interruption  Per guidelines by Dr Donnie mcdonald ASA needed upon discharge    Education completed with pt at this visit:  Reviewed normal post-op PVI healing process, when to contact EP-RN/EP-MD, contact information was given to the pt for further concerns or questions, and pt verbalized understanding    Follow up  Pts AVS was printed and mailed to pt by scheduling team and pt will be seen by EP NP at 6 wks, monitor will be ordered at this follow-up if indicated as well as 3mo follow-up with either EP NGOZI or EP MD    4/17/2025 9:28 AM  Elham Zavala RN

## 2025-04-17 NOTE — PATIENT INSTRUCTIONS
Instructions Following your Ablation Procedure    Your anticoagulation medication Eliquis:  It is important to remain on your anticoagulation medication uninterrupted after your ablation to reduce your risk of a stroke or heart attack, do not stop this medication  Please contact me if you have any questions regarding your anticoagulation medication    Groin care instructions  Keep the site clean and dry, do not place a bandage over the site. If there is minor oozing, apply another bandaid and remove it after 12 hours.  Mild bruising at the access sites is normal. If you notice increased swelling, external bleeding, or have other concerns regarding your access sites please consider emergency department evaluation for significant changes and call your electrophysiology team's office.  You may experience mild discomfort at your groin sites, applying ice packs 20min 3-4 times a day can help alleviate this discomfort.    Activity recommendations  You can resume driving.  Avoid stooping or squatting more than 90 degrees at the hips, repetitive motions such as loading , vacuuming, raking or shoveling, and heavy lifting (greater than 25lbs) for 1 week  Increase your activity gradually over the next 5-10 days, working back to your normal daily activity/routine.    Post ablation instructions  Stay well hydrated, and increase your fluid intake during this recovery period  High protein foods aide in your bodies healing process  You may have some irregular heartbeats and/or atrial fibrillation following your ablation which is normal to recovery, these episodes should occur less frequently over time.   Recurrent atrial fibrillation can occur within the first 3 months post ablation while your heart recovers from the procedure. Please call the electrophysiology team's office if you have an episode lasting greater than 4 hours, or if you notice the episodes are increasing in frequency or duration  Pleuritic chest  discomfort (chest pain worse with taking deep breaths, worse with laying flat on your back) can occur after ablation, usually coming about within the first 24-48hrs post ablation. If this occurs and is severe enough to be troublesome to you, please call us and consider starting a course of ibuprofen 400mg three times daily for 5 to 7 days    Things to watch for  As with any type of procedure, please be more attentive to unusual symptoms post ablation (eg. fever, neurologic changes, pain with swallowing, loss of consciousness, etc) - we recommend ER evaluation for any such symptoms in the first few weeks post procedure.    Consider ER evaluation for the following:  Severe chest pain not relieved by Tylenol or Ibuprofen  You have chills or a fever greater than 101 F (38 C)  Neurologic changes (eg. leg, arm or face weakness or numbness, difficulties with speech or word finding, problems  walking or with your balance, vision changes)  Severe difficulty swallowing and/or you are coughing up blood  Shortness of breath  Increased groin pain or a large or growing hard lump around the site  Groin is red, swollen, hot or tender  Blood or fluid is draining from the groin site  Any numbness, coolness or changes in color in your extremities  Groin pain not relieved by Tylenol or Advil  Recurrent atrial fibrillation associated with sustained rapid heart rates or associated with additional concerning  symptoms.    Your follow up appointments are as follows:  You will be seen by the electrophysiologist nurse practitioner at 6 weeks after your ablation  At your 6 week appointment, a 3 month follow-up appointment will be arranged with either the Nurse Practitioner or the Electrophysiology provider     Sincerely,  Elham Zavala RN [phone]    After hours please contact the on call service at # [phone]

## 2025-05-28 NOTE — PROGRESS NOTES
HEART CARE ELECTROPHYSIOLOGY NOTE      [facility]  [phone]        Assessment/Recommendations   Assessment/Plan:  1.  Paroxysmal Atrial Fibrillation: No symptomatology or evidence of AF recurence.  Uneventful recovery from ablation with no emergency department or unscheduled clinic visits.  He remains off membrane active antiarrhythmic medications.  Reviewed avoidance of possible triggers.    -- Discontinue metoprolol  -- Utilization of Galaxy watch and Kardia device for rhythm monitoring  -- ZIO monitor x 14 days to be worn 3 months post ablation    He was reassured that atrial fibrillation is not life-threatening, but carries an increased risk for stroke.  He has a [id number] score of 4 for age 65-74, HTN, vascular disease, DM.  Long-term continuation of OAC indicated.  Discussed alternative of pLAAC.    --Continue Eliquis 5 mg twice daily for stroke prophylaxis.    2.  Hypertension: Controlled    Follow-up with Dr. Zuniga in September  Follow up with me 1 year post ablation     History of Present Illness/Subjective    HPI: Kael Dubois is a 69 year old male who comes in today for EP follow-up of atrial fibrillation.  He has a history of paroxysmal atrial fibrillation, hypertension, hyperlipidemia, carotid stenosis s/p left CEA 9/1/2022, type 2 diabetes, possible sleep apnea.      Arrhythmia history  Dx/date: PAF 8/11/2023, though episodes of irregular heart rate on his blood pressure monitor for about 2 months prior.  He believes episodes were triggered by consuming a large quantity of black licorice and taking pseudoephedrine.  Sx: Mild awareness of arrhythmia  [id number] score: 4 for age 65-74, HTN, vascular disease, DM  Oral anticoagulation: Eliquis 5 mg twice daily  Antiarrhythmic medications, AV danielle blocking agents: Metoprolol succinate 25 mg daily  Procedures  DCCV: N/A  Ablation: 4/14/2025 by Dr. Fields ([facility])    He states that he feels well.  He reports an uneventful  recovery from ablation with no groin site issues or neurologic changes.  He has not had any A-fib.  He monitors his heart rate with his Galaxy watch and Kardia device.  He denies chest discomfort, palpitations, abdominal fullness/bloating or peripheral edema, shortness of breath, paroxysmal nocturnal dyspnea, orthopnea, lightheadedness, dizziness, pre-syncope, or syncope.  He reports sleeping well, no nocturnal desaturations noted on his watch.  BP consistently 110s/70.    Cardiographics (EKG personally reviewed):  EKG done 4/14/2025 shows sinus bradycardia 52 bpm, first-degree AV delay, QRS 92 ms, QT/QTc 476/442 ms  EKG done 8/31/2023 shows atrial fibrillation with rapid ventricular response at 131 bpm    Kardia ECG strips from 2/26/2025 & 2/27/2025 show A-fib with RVR up to 130s followed by sinus rhythm 80s.    MCOT worn 12/13/2023 to 12/26/2023 shows sinus rhythm with mild first-degree AV delay.  Average heart rate of 66 bpm and a range of 47 to 119 bpm.  No significant bradycardia or pauses.  No atrial fibrillation or other tachyarrhythmias.  No significant ventricular ectopy.    MARIELA:  Cardiac event monitoring from 7/27/2023 to 8/9/2023 (monitored duration 12d 17h 14m).  Baseline rhythm was sinus rhythm 86bpm.    Reported heart rate range 50 to 120bpm, average 76bpm.  1 symptom triggers correlated to sinus rhythm 84bpm.  No tachyarrhythmias.  No atrial fibrillation.  Intermittent first degree AV block.  There were no pauses noted.  Supraventricular and ventricular ectopic beat frequency are not reported on this monitoring modality.      ECHO done 8/28/2023 ([facility]):    NM treadmill stress test done 9/18/2023:    The exercise nuclear stress test is negative for inducible myocardial ischemia or infarction.    The exercise stress electrocardiogram is negative for inducible ischemic EKG changes.    The patient's exercise capacity is average for age and gender. The patient exercised for 8:59 minutes on the Amado  protocol, achieving 10.3 METs and 91% the age-predicted maximum heart rate. The patient had no symptoms.    The left ventricular ejection fraction at stress is greater than 70%.    The patient is at a low risk of future cardiac ischemic events.    There is no prior study for comparison.    I have reviewed and updated the patient's Past Medical History, Social History, Family History and Medication List.  Outside records reviewed.     Physical Examination  Review of Systems   Vitals: /74 (BP Location: Right arm, Patient Position: Sitting, Cuff Size: Adult Large)   Pulse 72   Resp 16   Wt 108.7 kg (239 lb 9.6 oz)   SpO2 98%   BMI 34.38 kg/m    BMI= Body mass index is 34.38 kg/m .  Wt Readings from Last 3 Encounters:   05/29/25 108.7 kg (239 lb 9.6 oz)   04/14/25 104.3 kg (230 lb)   04/11/25 104.3 kg (230 lb)       General Appearance:   Alert, well-appearing and in no acute distress.   HEENT: Atraumatic, normocephalic.  No scleral icterus, normal conjunctivae, EOMs intact, PERRL.  Mucous membranes pink and moist.     Chest/Lungs:   Chest symmetric, spine straight.  Respirations unlabored.  Lungs are clear to auscultation.   Cardiovascular:   Regular rate and rhythm.  Normal first and second heart sounds with no murmurs, rubs, or gallops; radial pulses are intact, no edema.   Abdomen:  Soft, nondistended.   Extremities: No cyanosis or clubbing.   Musculoskeletal: Moves all extremities.     Skin: Warm, dry, intact.    Neurologic: Mood and affect are appropriate.  Alert and oriented to person, place, time, and situation.     ROS: 10 point ROS neg other than the symptoms noted above in the HPI.        Medical History  Surgical History Family History Social History   Past Medical History:   Diagnosis Date    Benign essential hypertension 09/14/2023    Bilateral carotid artery stenosis 11/13/2023    Diverticulosis 11/13/2023    Elevated coronary artery calcium score 11/13/2023    Elevated PSA 11/13/2023     Hyperlipidemia, unspecified hyperlipidemia type 2023    Paroxysmal atrial fibrillation (H) 2023    Type 2 diabetes mellitus without complication, without long-term current use of insulin (H) 2023     Past Surgical History:   Procedure Laterality Date    CAROTID ENDARTERECTOMY Left 2022    EP ABLATION PULMONARY VEIN ISOLATION N/A 2025    Procedure: Ablation Atrial Fibrillation;  Surgeon: Montserrat Fields MD;  Location: [facility]    INGUINAL HERNIA REPAIR      UMBILICAL HERNIA REPAIR       History reviewed. No pertinent family history.     Social History     Socioeconomic History    Marital status:      Spouse name: Not on file    Number of children: Not on file    Years of education: Not on file    Highest education level: Not on file   Occupational History    Not on file   Tobacco Use    Smoking status: Former     Current packs/day: 0.00     Types: Cigarettes, Cigars     Quit date:      Years since quittin.4    Smokeless tobacco: Never    Tobacco comments:     Occasional cigar    Vaping Use    Vaping status: Never Used   Substance and Sexual Activity    Alcohol use: Not on file    Drug use: Never    Sexual activity: Not on file   Other Topics Concern    Not on file   Social History Narrative    Not on file     Social Drivers of Health     Financial Resource Strain: Not on file   Food Insecurity: Not on file   Transportation Needs: Not on file   Physical Activity: Not on file   Stress: Not on file   Social Connections: Unknown (2022)    [facility] [employer] [facility]    Social Connections     Frequency of Communication with Friends and Family: Not on file   Interpersonal Safety: Low Risk  (2025)    Interpersonal Safety     Do you feel physically and emotionally safe where you currently live?: Yes     Within the past 12 months, have you been hit, slapped, kicked or otherwise physically hurt by someone?: No     Within  the past 12 months, have you been humiliated or emotionally abused in other ways by your partner or ex-partner?: No   Housing Stability: Not on file           Medications  Allergies   Current Outpatient Medications   Medication Sig Dispense Refill    ELIQUIS ANTICOAGULANT 5 MG tablet Take 1 tablet (5 mg) by mouth every 12 hours. 180 tablet 3    fluticasone (FLONASE) 50 MCG/ACT nasal spray Spray 1 spray into both nostrils daily as needed      latanoprost (XALATAN) 0.005 % ophthalmic solution Place 1 drop into both eyes At Bedtime      lisinopril (ZESTRIL) 20 MG tablet Take 1 tablet by mouth 2 times daily.      metFORMIN (GLUCOPHAGE XR) 500 MG 24 hr tablet Take 3 tablets by mouth daily (with dinner). (Patient taking differently: Take 1,000 mg by mouth 2 times daily (with meals).)      rosuvastatin (CRESTOR) 40 MG tablet Take 1 tablet by mouth daily.       No Known Allergies       Lab Results    Chemistry/lipid CBC Cardiac Enzymes/BNP/TSH/INR   Recent Labs   Lab Test 03/13/25  0840 09/05/24  0845   CHOL 136 147   HDL 50 54   LDL 64 77   TRIG 112 78     Recent Labs   Lab Test 04/14/25  0833 04/11/25  0827 03/13/25  0840   NA  --  138 136   POTASSIUM  --  4.5 4.4   CHLORIDE  --  104 101   CO2  --  28 23   ANIONGAP  --  6* 12   * 136* 126*   BUN  --  17.7 16.3   CR  --  1.09 1.07   MEREDITH  --  9.1 9.3      CBC RESULTS:   Recent Labs   Lab Test 04/11/25  0827   WBC 6.6   RBC 4.47   HGB 13.8   HCT 40.7   MCV 91   MCH 30.9   MCHC 33.9   RDW 12.4           TSH   Date Value Ref Range Status   08/11/2023 2.18 0.30 - 4.20 uIU/mL Final            The longitudinal plan of care for the diagnosis(es)/condition(s) as documented were addressed during this visit. Due to the added complexity in care, I will continue to support Kael in the subsequent management and with ongoing continuity of care.

## 2025-05-29 ENCOUNTER — OFFICE VISIT (OUTPATIENT)
Dept: CARDIOLOGY | Facility: CLINIC | Age: 70
End: 2025-05-29
Payer: COMMERCIAL

## 2025-05-29 VITALS
RESPIRATION RATE: 16 BRPM | WEIGHT: 239.6 LBS | HEART RATE: 72 BPM | OXYGEN SATURATION: 98 % | DIASTOLIC BLOOD PRESSURE: 74 MMHG | SYSTOLIC BLOOD PRESSURE: 134 MMHG | BODY MASS INDEX: 34.38 KG/M2

## 2025-05-29 DIAGNOSIS — Z86.79 S/P ABLATION OF ATRIAL FIBRILLATION: ICD-10-CM

## 2025-05-29 DIAGNOSIS — Z98.890 S/P ABLATION OF ATRIAL FIBRILLATION: ICD-10-CM

## 2025-05-29 DIAGNOSIS — R93.1 ELEVATED CORONARY ARTERY CALCIUM SCORE: ICD-10-CM

## 2025-05-29 DIAGNOSIS — I48.0 PAROXYSMAL ATRIAL FIBRILLATION (H): Primary | ICD-10-CM

## 2025-05-29 DIAGNOSIS — I10 BENIGN ESSENTIAL HYPERTENSION: ICD-10-CM

## 2025-05-29 NOTE — LETTER
5/29/2025    Amado Baez MD  [address] [address]    RE: Kael Dubois       Dear Colleague,     I had the pleasure of seeing Kael Dubois in the [facility].    HEART CARE ELECTROPHYSIOLOGY NOTE      [facility]  [phone]        Assessment/Recommendations   Assessment/Plan:  1.  Paroxysmal Atrial Fibrillation: No symptomatology or evidence of AF recurence.  Uneventful recovery from ablation with no emergency department or unscheduled clinic visits.  He remains off membrane active antiarrhythmic medications.  Reviewed avoidance of possible triggers.    -- Discontinue metoprolol  -- Utilization of Galaxy watch and [employer] device for rhythm monitoring  -- ZIO monitor x 14 days to be worn 3 months post ablation    He was reassured that atrial fibrillation is not life-threatening, but carries an increased risk for stroke.  He has a [id number] score of 4 for age 65-74, HTN, vascular disease, DM.  Long-term continuation of OAC indicated.  Discussed alternative of pLAAC.    --Continue Eliquis 5 mg twice daily for stroke prophylaxis.    2.  Hypertension: Controlled    Follow-up with Dr. Zuniga in September  Follow up with me 1 year post ablation     History of Present Illness/Subjective    HPI: Kael Dubois is a 69 year old male who comes in today for EP follow-up of atrial fibrillation.  He has a history of paroxysmal atrial fibrillation, hypertension, hyperlipidemia, carotid stenosis s/p left CEA 9/1/2022, type 2 diabetes, possible sleep apnea.      Arrhythmia history  Dx/date: PAF 8/11/2023, though episodes of irregular heart rate on his blood pressure monitor for about 2 months prior.  He believes episodes were triggered by consuming a large quantity of black licorice and taking pseudoephedrine.  Sx: Mild awareness of arrhythmia  [id number] score: 4 for age 65-74, HTN, vascular disease, DM  Oral anticoagulation: Eliquis 5 mg twice  daily  Antiarrhythmic medications, AV danielle blocking agents: Metoprolol succinate 25 mg daily  Procedures  DCCV: N/A  Ablation: 4/14/2025 by Dr. Fields ([facility])    He states that he feels well.  He reports an uneventful recovery from ablation with no groin site issues or neurologic changes.  He has not had any A-fib.  He monitors his heart rate with his Galaxy watch and Kardia device.  He denies chest discomfort, palpitations, abdominal fullness/bloating or peripheral edema, shortness of breath, paroxysmal nocturnal dyspnea, orthopnea, lightheadedness, dizziness, pre-syncope, or syncope.  He reports sleeping well, no nocturnal desaturations noted on his watch.  BP consistently 110s/70.    Cardiographics (EKG personally reviewed):  EKG done 4/14/2025 shows sinus bradycardia 52 bpm, first-degree AV delay, QRS 92 ms, QT/QTc 476/442 ms  EKG done 8/31/2023 shows atrial fibrillation with rapid ventricular response at 131 bpm    Kardia ECG strips from 2/26/2025 & 2/27/2025 show A-fib with RVR up to 130s followed by sinus rhythm 80s.    MCOT worn 12/13/2023 to 12/26/2023 shows sinus rhythm with mild first-degree AV delay.  Average heart rate of 66 bpm and a range of 47 to 119 bpm.  No significant bradycardia or pauses.  No atrial fibrillation or other tachyarrhythmias.  No significant ventricular ectopy.    MARIELA:  Cardiac event monitoring from 7/27/2023 to 8/9/2023 (monitored duration 12d 17h 14m).  Baseline rhythm was sinus rhythm 86bpm.    Reported heart rate range 50 to 120bpm, average 76bpm.  1 symptom triggers correlated to sinus rhythm 84bpm.  No tachyarrhythmias.  No atrial fibrillation.  Intermittent first degree AV block.  There were no pauses noted.  Supraventricular and ventricular ectopic beat frequency are not reported on this monitoring modality.      ECHO done 8/28/2023 ([facility]):    NM treadmill stress test done 9/18/2023:     The exercise nuclear stress test is negative for inducible myocardial ischemia or  infarction.     The exercise stress electrocardiogram is negative for inducible ischemic EKG changes.     The patient's exercise capacity is average for age and gender. The patient exercised for 8:59 minutes on the Amado protocol, achieving 10.3 METs and 91% the age-predicted maximum heart rate. The patient had no symptoms.     The left ventricular ejection fraction at stress is greater than 70%.     The patient is at a low risk of future cardiac ischemic events.     There is no prior study for comparison.    I have reviewed and updated the patient's Past Medical History, Social History, Family History and Medication List.  Outside records reviewed.     Physical Examination  Review of Systems   Vitals: /74 (BP Location: Right arm, Patient Position: Sitting, Cuff Size: Adult Large)   Pulse 72   Resp 16   Wt 108.7 kg (239 lb 9.6 oz)   SpO2 98%   BMI 34.38 kg/m    BMI= Body mass index is 34.38 kg/m .  Wt Readings from Last 3 Encounters:   05/29/25 108.7 kg (239 lb 9.6 oz)   04/14/25 104.3 kg (230 lb)   04/11/25 104.3 kg (230 lb)       General Appearance:   Alert, well-appearing and in no acute distress.   HEENT: Atraumatic, normocephalic.  No scleral icterus, normal conjunctivae, EOMs intact, PERRL.  Mucous membranes pink and moist.     Chest/Lungs:   Chest symmetric, spine straight.  Respirations unlabored.  Lungs are clear to auscultation.   Cardiovascular:   Regular rate and rhythm.  Normal first and second heart sounds with no murmurs, rubs, or gallops; radial pulses are intact, no edema.   Abdomen:  Soft, nondistended.   Extremities: No cyanosis or clubbing.   Musculoskeletal: Moves all extremities.     Skin: Warm, dry, intact.    Neurologic: Mood and affect are appropriate.  Alert and oriented to person, place, time, and situation.     ROS: 10 point ROS neg other than the symptoms noted above in the HPI.        Medical History  Surgical History Family History Social History   Past Medical History:    Diagnosis Date     Benign essential hypertension 2023     Bilateral carotid artery stenosis 2023     Diverticulosis 2023     Elevated coronary artery calcium score 2023     Elevated PSA 2023     Hyperlipidemia, unspecified hyperlipidemia type 2023     Paroxysmal atrial fibrillation (H) 2023     Type 2 diabetes mellitus without complication, without long-term current use of insulin (H) 2023     Past Surgical History:   Procedure Laterality Date     CAROTID ENDARTERECTOMY Left 2022     EP ABLATION PULMONARY VEIN ISOLATION N/A 2025    Procedure: Ablation Atrial Fibrillation;  Surgeon: Montserrat Fields MD;  [place] [facility] [place]UINAL HERNIA REPAIR       UMBILICAL HERNIA REPAIR       History reviewed. No pertinent family history.     Social History     Socioeconomic History     Marital status:      Spouse name: Not on file     Number of children: Not on file     Years of education: Not on file     Highest education level: Not on file   Occupational History     Not on file   Tobacco Use     Smoking status: Former     Current packs/day: 0.00     Types: Cigarettes, Cigars     Quit date:      Years since quittin.4     Smokeless tobacco: Never     Tobacco comments:     Occasional cigar    Vaping Use     Vaping status: Never Used   Substance and Sexual Activity     Alcohol use: Not on file     Drug use: Never     Sexual activity: Not on file   Other Topics Concern     Not on file   Social History Narrative     Not on file     Social Drivers of Health     Financial Resource Strain: Not on file   Food Insecurity: Not on file   Transportation Needs: Not on file   Physical Activity: Not on file   Stress: Not on file   Social Connections: Unknown (2022)    [facility] [facility]    Social Connections      Frequency of Communication with Friends and Family: Not on file   Interpersonal Safety:  Low Risk  (4/14/2025)    Interpersonal Safety      Do you feel physically and emotionally safe where you currently live?: Yes      Within the past 12 months, have you been hit, slapped, kicked or otherwise physically hurt by someone?: No      Within the past 12 months, have you been humiliated or emotionally abused in other ways by your partner or ex-partner?: No   Housing Stability: Not on file           Medications  Allergies   Current Outpatient Medications   Medication Sig Dispense Refill     ELIQUIS ANTICOAGULANT 5 MG tablet Take 1 tablet (5 mg) by mouth every 12 hours. 180 tablet 3     fluticasone (FLONASE) 50 MCG/ACT nasal spray Spray 1 spray into both nostrils daily as needed       latanoprost (XALATAN) 0.005 % ophthalmic solution Place 1 drop into both eyes At Bedtime       lisinopril (ZESTRIL) 20 MG tablet Take 1 tablet by mouth 2 times daily.       metFORMIN (GLUCOPHAGE XR) 500 MG 24 hr tablet Take 3 tablets by mouth daily (with dinner). (Patient taking differently: Take 1,000 mg by mouth 2 times daily (with meals).)       rosuvastatin (CRESTOR) 40 MG tablet Take 1 tablet by mouth daily.       No Known Allergies       Lab Results    Chemistry/lipid CBC Cardiac Enzymes/BNP/TSH/INR   Recent Labs   Lab Test 03/13/25  0840 09/05/24  0845   CHOL 136 147   HDL 50 54   LDL 64 77   TRIG 112 78     Recent Labs   Lab Test 04/14/25  0833 04/11/25  0827 03/13/25  0840   NA  --  138 136   POTASSIUM  --  4.5 4.4   CHLORIDE  --  104 101   CO2  --  28 23   ANIONGAP  --  6* 12   * 136* 126*   BUN  --  17.7 16.3   CR  --  1.09 1.07   MEREDITH  --  9.1 9.3      CBC RESULTS:   Recent Labs   Lab Test 04/11/25  0827   WBC 6.6   RBC 4.47   HGB 13.8   HCT 40.7   MCV 91   MCH 30.9   MCHC 33.9   RDW 12.4           TSH   Date Value Ref Range Status   08/11/2023 2.18 0.30 - 4.20 uIU/mL Final            The longitudinal plan of care for the diagnosis(es)/condition(s) as documented were addressed during this visit. Due to the  added complexity in care, I will continue to support Kael in the subsequent management and with ongoing continuity of care.                                             Thank you for allowing me to participate in the care of your patient.      Sincerely,     AARON Ceballos CNP     [facility]  cc:   AARON Ceballos CNP  [address], [address]  [place] [address]

## 2025-05-29 NOTE — PATIENT INSTRUCTIONS
Kael Dubois,    It was a pleasure to see you today at the [facility].     My recommendations after this visit include:    Stop taking metoprolol  Zio monitor x 14 days in July.  It will be mailed to you.    Use your watch and Kardia to monitor your rhythm--call for AF recurrence    Follow up with Dr. Zuniga in 3 months  Follow up with me 1 year post ablation (April 2026)    Lashell Anthony, [facility], Electrophysiology  [phone]  EP nurses [phone]

## 2025-06-28 ENCOUNTER — HEALTH MAINTENANCE LETTER (OUTPATIENT)
Age: 70
End: 2025-06-28

## 2025-08-18 ENCOUNTER — RESULTS FOLLOW-UP (OUTPATIENT)
Dept: CARDIOLOGY | Facility: CLINIC | Age: 70
End: 2025-08-18
Payer: COMMERCIAL

## (undated) DEVICE — CABLE CATH FARASTAR CONNECTION STERILE LF DISP M004PF41M434

## (undated) DEVICE — TRANSDUCER TRAY ARTERIAL 42646-06

## (undated) DEVICE — SHEATH INTRO FARADRIVE L74 CM ID13 FR STEERABLE M004PF21M402

## (undated) DEVICE — ELECTRODE DEFIB CADENCE 22550R

## (undated) DEVICE — SHEATH PINNACLE 9FR 10CM W/MARKER

## (undated) DEVICE — PATCH CARTO 3 EXTERNAL REFERENCE 3D MAPPING CREFP6

## (undated) DEVICE — Device

## (undated) DEVICE — 8F SOUNDSTAR ECO ULTRASOUND CATHETER

## (undated) DEVICE — CUSTOM PACK EP

## (undated) DEVICE — INTRO TERUMO 8FRX25CM W/MARKER RSB803

## (undated) DEVICE — INTRODUCER CHECK FLO 18FRX30CM .038 RCFW-18.0P-38-30-RB

## (undated) DEVICE — CATHETER OCTARAY LONG SPLINE CURVE F 3-3-3-3-3 D160906

## (undated) DEVICE — CATH ABLATION FARAWAVE 115X180CM OD31 MM OTW 20 M004PF41M401

## (undated) DEVICE — GUIDEWIRE JTIP 3MM .035 180CM IQ35F180J3

## (undated) DEVICE — GUIDE WIRE SHEATH VERSACROSS D1 CURVE 93CM L180 VXAK0041

## (undated) DEVICE — CATH EP 7FR X 115CM DECANAV CA

## (undated) RX ORDER — FENTANYL CITRATE 50 UG/ML
INJECTION, SOLUTION INTRAMUSCULAR; INTRAVENOUS
Status: DISPENSED
Start: 2025-04-14

## (undated) RX ORDER — HEPARIN SODIUM 10000 [USP'U]/100ML
INJECTION, SOLUTION INTRAVENOUS
Status: DISPENSED
Start: 2025-04-14

## (undated) RX ORDER — PROTAMINE SULFATE 10 MG/ML
INJECTION, SOLUTION INTRAVENOUS
Status: DISPENSED
Start: 2025-04-14